# Patient Record
Sex: FEMALE | URBAN - METROPOLITAN AREA
[De-identification: names, ages, dates, MRNs, and addresses within clinical notes are randomized per-mention and may not be internally consistent; named-entity substitution may affect disease eponyms.]

---

## 2024-01-24 ENCOUNTER — OFFICE VISIT (OUTPATIENT)
Dept: OBGYN | Age: 29
End: 2024-01-24
Payer: COMMERCIAL

## 2024-01-24 ENCOUNTER — HOSPITAL ENCOUNTER (OUTPATIENT)
Age: 29
Setting detail: SPECIMEN
Discharge: HOME | End: 2024-01-24
Payer: COMMERCIAL

## 2024-01-24 VITALS
BODY MASS INDEX: 21.44 KG/M2 | WEIGHT: 121 LBS | HEART RATE: 100 BPM | SYSTOLIC BLOOD PRESSURE: 116 MMHG | DIASTOLIC BLOOD PRESSURE: 69 MMHG | HEIGHT: 63 IN

## 2024-01-24 DIAGNOSIS — N91.2 AMENORRHEA: Primary | ICD-10-CM

## 2024-01-24 DIAGNOSIS — Z01.419 WELL WOMAN EXAM WITH ROUTINE GYNECOLOGICAL EXAM: ICD-10-CM

## 2024-01-24 LAB
CONTROL: NORMAL
PREGNANCY TEST URINE, POC: POSITIVE

## 2024-01-24 PROCEDURE — 88142 CYTOPATH C/V THIN LAYER: CPT

## 2024-01-24 PROCEDURE — 87801 DETECT AGNT MULT DNA AMPLI: CPT

## 2024-01-24 PROCEDURE — 81025 URINE PREGNANCY TEST: CPT | Performed by: OBSTETRICS & GYNECOLOGY

## 2024-01-24 PROCEDURE — 87624 HPV HI-RISK TYP POOLED RSLT: CPT

## 2024-01-24 PROCEDURE — 99385 PREV VISIT NEW AGE 18-39: CPT | Performed by: OBSTETRICS & GYNECOLOGY

## 2024-01-24 RX ORDER — CALCIUM CARBONATE 300MG(750)
1 TABLET,CHEWABLE ORAL
Qty: 90 TABLET | Refills: 5 | Status: SHIPPED | OUTPATIENT
Start: 2024-01-24

## 2024-01-24 SDOH — ECONOMIC STABILITY: INCOME INSECURITY: HOW HARD IS IT FOR YOU TO PAY FOR THE VERY BASICS LIKE FOOD, HOUSING, MEDICAL CARE, AND HEATING?: SOMEWHAT HARD

## 2024-01-24 SDOH — ECONOMIC STABILITY: FOOD INSECURITY: WITHIN THE PAST 12 MONTHS, YOU WORRIED THAT YOUR FOOD WOULD RUN OUT BEFORE YOU GOT MONEY TO BUY MORE.: SOMETIMES TRUE

## 2024-01-24 SDOH — ECONOMIC STABILITY: HOUSING INSECURITY
IN THE LAST 12 MONTHS, WAS THERE A TIME WHEN YOU DID NOT HAVE A STEADY PLACE TO SLEEP OR SLEPT IN A SHELTER (INCLUDING NOW)?: NO

## 2024-01-24 SDOH — ECONOMIC STABILITY: FOOD INSECURITY: WITHIN THE PAST 12 MONTHS, THE FOOD YOU BOUGHT JUST DIDN'T LAST AND YOU DIDN'T HAVE MONEY TO GET MORE.: SOMETIMES TRUE

## 2024-01-24 ASSESSMENT — PATIENT HEALTH QUESTIONNAIRE - PHQ9
SUM OF ALL RESPONSES TO PHQ QUESTIONS 1-9: 2
1. LITTLE INTEREST OR PLEASURE IN DOING THINGS: 1
SUM OF ALL RESPONSES TO PHQ9 QUESTIONS 1 & 2: 2
SUM OF ALL RESPONSES TO PHQ QUESTIONS 1-9: 2
2. FEELING DOWN, DEPRESSED OR HOPELESS: 1
SUM OF ALL RESPONSES TO PHQ QUESTIONS 1-9: 2
SUM OF ALL RESPONSES TO PHQ QUESTIONS 1-9: 2

## 2024-01-24 NOTE — PROGRESS NOTES
24    Andrew Hassan  1995    Chief Complaint   Patient presents with    New Patient     Pt here for annual, is sexually active, irregular menses, LMP-2023, pap-never.    Irregular Menses     Pt c/o amenorrhea x 2 months, LMP-2023. Urine pregnancy test (+).         Andrew Hassan is a 28 y.o. female who presents today for evaluation of see above.    Past Medical History:   Diagnosis Date    Dysmenorrhea     Nausea     Pregnant        No past surgical history on file.    Social History     Tobacco Use    Smoking status: Never    Smokeless tobacco: Never   Vaping Use    Vaping Use: Never used   Substance Use Topics    Alcohol use: Not Currently    Drug use: Never       No family history on file.    Current Outpatient Medications   Medication Sig Dispense Refill    Prenatal MV-Min-FA-Omega-3 (PRENATAL GUMMIES/DHA & FA) 0.4-32.5 MG CHEW Take 1 tablet by mouth daily 90 tablet 5     No current facility-administered medications for this visit.       No Known Allergies          There is no immunization history on file for this patient.    Review of Systems  All other systems reviewed and are negative    /69 (Site: Left Upper Arm, Position: Sitting, Cuff Size: Medium Adult)   Pulse 100   Ht 1.6 m (5' 3\")   Wt 54.9 kg (121 lb)   LMP 2023   BMI 21.43 kg/m²     Physical Exam  Nursing note reviewed. Exam conducted with a chaperone present.   HENT:      Head: Normocephalic.      Nose: Nose normal.   Eyes:      Extraocular Movements: Extraocular movements intact.   Pulmonary:      Effort: Pulmonary effort is normal.   Abdominal:      General: Abdomen is flat.      Palpations: Abdomen is soft.      Hernia: There is no hernia in the left inguinal area or right inguinal area.   Genitourinary:     General: Normal vulva.      Exam position: Lithotomy position.      Pubic Area: No rash or pubic lice.       Labia:         Right: No rash, tenderness, lesion or injury.         Left: No rash,

## 2024-01-27 LAB
C TRACH RRNA SPEC QL NAA+PROBE: NEGATIVE
N GONORRHOEA RRNA SPEC QL NAA+PROBE: NEGATIVE

## 2024-01-29 LAB
HPV HIGH RISK: DETECTED
HPV, GENOTYPE 16: NOT DETECTED
HPV, GENOTYPE 18: DETECTED

## 2024-02-14 ENCOUNTER — PROCEDURE VISIT (OUTPATIENT)
Dept: OBGYN | Age: 29
End: 2024-02-14

## 2024-02-14 VITALS
DIASTOLIC BLOOD PRESSURE: 66 MMHG | SYSTOLIC BLOOD PRESSURE: 105 MMHG | WEIGHT: 126 LBS | HEIGHT: 63 IN | BODY MASS INDEX: 22.32 KG/M2

## 2024-02-14 DIAGNOSIS — B97.7 HPV IN FEMALE: Primary | ICD-10-CM

## 2024-02-16 NOTE — PROGRESS NOTES
24    Andrew Hassan  1995    Chief Complaint   Patient presents with    Procedure     Colposcopy today, consent signed. Pap 2024 neg + hpv + 18  Pregnant.         Andrew Hassan is a 28 y.o. female who presents today for evaluation of see above.    Past Medical History:   Diagnosis Date    Dysmenorrhea     Nausea     Pregnant        No past surgical history on file.    Social History     Tobacco Use    Smoking status: Never    Smokeless tobacco: Never   Vaping Use    Vaping Use: Never used   Substance Use Topics    Alcohol use: Not Currently    Drug use: Never       No family history on file.    Current Outpatient Medications   Medication Sig Dispense Refill    Prenatal MV-Min-FA-Omega-3 (PRENATAL GUMMIES/DHA & FA) 0.4-32.5 MG CHEW Take 1 tablet by mouth daily 90 tablet 5     No current facility-administered medications for this visit.       No Known Allergies          There is no immunization history on file for this patient.    Review of Systems  All other systems reviewed and are negative    /66 (Site: Left Upper Arm, Position: Sitting, Cuff Size: Large Adult)   Ht 1.6 m (5' 3\")   Wt 57.2 kg (126 lb)   LMP 2023   BMI 22.32 kg/m²     Physical Exam    Pelvic examination performed.  Acetic acid applied to the cervix.  Colposcope was used to visualize the cervix.  The entire transformation zone was seen.  No abnormal lesions were noted including but not limited to high epithelium, abnormal vessels or mosaicism    No results found for this visit on 24.    ASSESSMENT AND PLAN   Diagnosis Orders   1. HPV in female            Colposcopy today due to negative pap, +hpv 18, consent signed.    Chaperone Ebony Guy was present for the entire appointment.       Return for follow up appointment.    Francis Bowens MD

## 2024-02-22 ENCOUNTER — INITIAL PRENATAL (OUTPATIENT)
Dept: OBGYN | Age: 29
End: 2024-02-22
Payer: COMMERCIAL

## 2024-02-22 VITALS — DIASTOLIC BLOOD PRESSURE: 66 MMHG | SYSTOLIC BLOOD PRESSURE: 108 MMHG | BODY MASS INDEX: 21.79 KG/M2 | WEIGHT: 123 LBS

## 2024-02-22 DIAGNOSIS — O30.012 MONOCHORIONIC AND MONOAMNIOTIC TWIN GESTATION IN SECOND TRIMESTER, ANTEPARTUM: ICD-10-CM

## 2024-02-22 DIAGNOSIS — O99.342 DEPRESSION AFFECTING PREGNANCY IN SECOND TRIMESTER, ANTEPARTUM: ICD-10-CM

## 2024-02-22 DIAGNOSIS — F32.A DEPRESSION AFFECTING PREGNANCY IN SECOND TRIMESTER, ANTEPARTUM: ICD-10-CM

## 2024-02-22 DIAGNOSIS — Z3A.16 16 WEEKS GESTATION OF PREGNANCY: Primary | ICD-10-CM

## 2024-02-22 DIAGNOSIS — Z34.01 ENCOUNTER FOR SUPERVISION OF NORMAL FIRST PREGNANCY IN FIRST TRIMESTER: ICD-10-CM

## 2024-02-22 DIAGNOSIS — O09.30 LATE PRENATAL CARE: ICD-10-CM

## 2024-02-22 DIAGNOSIS — Z34.81 ENCOUNTER FOR SUPERVISION OF OTHER NORMAL PREGNANCY IN FIRST TRIMESTER: ICD-10-CM

## 2024-02-22 PROCEDURE — 0501F PRENATAL FLOW SHEET: CPT | Performed by: NURSE PRACTITIONER

## 2024-02-22 PROCEDURE — 36415 COLL VENOUS BLD VENIPUNCTURE: CPT | Performed by: NURSE PRACTITIONER

## 2024-02-22 PROCEDURE — H1000 PRENATAL CARE ATRISK ASSESSM: HCPCS | Performed by: NURSE PRACTITIONER

## 2024-02-22 PROCEDURE — H1002 CARECOORDINATION PRENATAL: HCPCS | Performed by: NURSE PRACTITIONER

## 2024-02-22 PROCEDURE — 0502F SUBSEQUENT PRENATAL CARE: CPT | Performed by: NURSE PRACTITIONER

## 2024-02-22 RX ORDER — PNV NO.95/FERROUS FUM/FOLIC AC 28MG-0.8MG
TABLET ORAL
COMMUNITY

## 2024-02-22 ASSESSMENT — ENCOUNTER SYMPTOMS
RESPIRATORY NEGATIVE: 1
NAUSEA: 0
CONSTIPATION: 0
GASTROINTESTINAL NEGATIVE: 1
VOMITING: 0
SHORTNESS OF BREATH: 0
DIARRHEA: 0
ABDOMINAL PAIN: 0

## 2024-02-22 NOTE — PROGRESS NOTES
Genotype RFLX    Culture, Urine      3. Monochorionic and monoamniotic twin gestation in second trimester, antepartum  Obstetric panel    HIV Screen    Hepatitis C RNA QNT W Genotype RFLX    Culture, Urine      4. Depression affecting pregnancy in second trimester, antepartum              Prenatal info given, hx and poc reviewed. Cultures up to date and negative. Pap negative with + HPV 18; colp 2/14/24. Labs, Panorama and Carrier Screens collected.      Bldg/pain precautions reviewed  Continue PNV QD  Ok to take Tylenol prn/sparingly    Appt with [facility] 2/20/14; instructed to start ASA QD  Anatomy u/s at [facility]    Depression discussed; describes as mild/intermittent and manageable at this time. Has support.       Return in about 4 weeks (around 3/21/2024).    Joselyn Lopez, SHAILESH - CNP

## 2024-02-23 LAB
ABO + RH BLD: NORMAL
BASOPHILS # BLD: 0 K/UL (ref 0–0.2)
BASOPHILS NFR BLD: 0.2 %
BLD GP AB SCN SERPL QL: NORMAL
DEPRECATED RDW RBC AUTO: 14 % (ref 12.4–15.4)
EOSINOPHIL # BLD: 0 K/UL (ref 0–0.6)
EOSINOPHIL NFR BLD: 0.2 %
HBV SURFACE AG SERPL QL IA: ABNORMAL
HCT VFR BLD AUTO: 33.5 % (ref 36–48)
HCV RNA SERPL NAA+PROBE-ACNC: NOT DETECTED IU/ML
HCV RNA SERPL NAA+PROBE-LOG IU: NOT DETECTED LOG IU/ML
HCV RNA SERPL QL NAA+PROBE: NOT DETECTED
HGB BLD-MCNC: 11.2 G/DL (ref 12–16)
HIV 1+2 AB+HIV1 P24 AG SERPL QL IA: NORMAL
HIV 2 AB SERPL QL IA: NORMAL
HIV1 AB SERPL QL IA: NORMAL
HIV1 P24 AG SERPL QL IA: NORMAL
LYMPHOCYTES # BLD: 1.2 K/UL (ref 1–5.1)
LYMPHOCYTES NFR BLD: 18.5 %
MCH RBC QN AUTO: 28.3 PG (ref 26–34)
MCHC RBC AUTO-ENTMCNC: 33.3 G/DL (ref 31–36)
MCV RBC AUTO: 85 FL (ref 80–100)
MONOCYTES # BLD: 0.5 K/UL (ref 0–1.3)
MONOCYTES NFR BLD: 7.9 %
NEUTROPHILS # BLD: 4.6 K/UL (ref 1.7–7.7)
NEUTROPHILS NFR BLD: 73.2 %
PLATELET # BLD AUTO: 242 K/UL (ref 135–450)
PMV BLD AUTO: 10 FL (ref 5–10.5)
RBC # BLD AUTO: 3.94 M/UL (ref 4–5.2)
REAGIN+T PALLIDUM IGG+IGM SERPL-IMP: ABNORMAL
RUBV IGG SERPL IA-ACNC: 381.6 IU/ML
WBC # BLD AUTO: 6.3 K/UL (ref 4–11)

## 2024-02-24 LAB — BACTERIA UR CULT: NORMAL

## 2024-03-06 LAB
Lab: NORMAL
NTRA 22Q11.2 DELETION SYNDROME POPULATION-BASED RISK TEXT: NORMAL
NTRA 22Q11.2 DELETION SYNDROME RESULT TEXT: NORMAL
NTRA 22Q11.2 DELETION SYNDROME RISK SCORE TEXT: NORMAL
NTRA FETAL FRACTION: NORMAL
NTRA GENDER OF FETUS: NORMAL
NTRA GENDER OF SECOND FETUS: NORMAL
NTRA MONOSOMY X AGE-BASED RISK TEXT: NORMAL
NTRA MONOSOMY X RESULT TEXT: NORMAL
NTRA MONOSOMY X RISK SCORE TEXT: NORMAL
NTRA TRISOMY 13 AGE-BASED RISK TEXT: NORMAL
NTRA TRISOMY 13 RESULT TEXT: NORMAL
NTRA TRISOMY 13 RISK SCORE TEXT: NORMAL
NTRA TRISOMY 18 AGE-BASED RISK TEXT: NORMAL
NTRA TRISOMY 18 RESULT TEXT: NORMAL
NTRA TRISOMY 18 RISK SCORE TEXT: NORMAL
NTRA TRISOMY 21 AGE-BASED RISK TEXT: NORMAL
NTRA TRISOMY 21 RESULT TEXT: NORMAL
NTRA TRISOMY 21 RISK SCORE TEXT: NORMAL
NTRA ZYGOSITY: NORMAL

## 2024-03-12 LAB
Lab: ABNORMAL
Lab: POSITIVE
NTRA ALPHA-THALASSEMIA: POSITIVE
NTRA BETA-HEMOGLOBINOPATHIES: POSITIVE
NTRA CANAVAN DISEASE: NEGATIVE
NTRA CYSTIC FIBROSIS: NEGATIVE
NTRA DUCHENNE/BECKER MUSCULAR DYSTROPHY: NEGATIVE
NTRA FAMILIAL DYSAUTONOMIA: NEGATIVE
NTRA FRAGILE X SYNDROME: NEGATIVE
NTRA GALACTOSEMIA: NEGATIVE
NTRA GAUCHER DISEASE: NEGATIVE
NTRA MEDIUM CHAIN ACYL-COA DEHYDROGENASE DEFICIENCY: NEGATIVE
NTRA POLYCYSTIC KIDNEY DISEASE, AUTOSOMAL RECESSIVE: NEGATIVE
NTRA SMITH-LEMLI-OPITZ SYNDROME: NEGATIVE
NTRA SPINAL MUSCULAR ATROPHY: NEGATIVE
NTRA TAY-SACHS DISEASE: NEGATIVE

## 2024-03-20 ENCOUNTER — ROUTINE PRENATAL (OUTPATIENT)
Dept: OBGYN | Age: 29
End: 2024-03-20

## 2024-03-20 VITALS — BODY MASS INDEX: 23.38 KG/M2 | SYSTOLIC BLOOD PRESSURE: 100 MMHG | DIASTOLIC BLOOD PRESSURE: 59 MMHG | WEIGHT: 132 LBS

## 2024-03-20 DIAGNOSIS — Z3A.20 20 WEEKS GESTATION OF PREGNANCY: ICD-10-CM

## 2024-03-20 DIAGNOSIS — Z34.02 ENCOUNTER FOR SUPERVISION OF NORMAL FIRST PREGNANCY IN SECOND TRIMESTER: Primary | ICD-10-CM

## 2024-03-20 PROCEDURE — 0502F SUBSEQUENT PRENATAL CARE: CPT | Performed by: OBSTETRICS & GYNECOLOGY

## 2024-03-22 NOTE — PROGRESS NOTES
Return OB Office Visit    CC:   Chief Complaint   Patient presents with    Routine Prenatal Visit     Ultrasound at [facility] yesterday for mono/di twins. See genetic labs needs discussed.        HPI:  Patient seen and examined. No concerns/complaints. Denies VB, LOF, ctx. +Fm.  Denies headaches, vision changes, RUQ pain, increased LE edema.  Denies chest pain, shortness of breath, fever, chills, nausea, vomiting.      Review of Systems: The following ROS was otherwise negative, except as noted in the HPI: constitutional, HEENT, respiratory, cardiovascular, gastrointestinal, genitourinary, skin, musculoskeletal, neurological, psych    Objective:  BP (!) 100/59   Wt 59.9 kg (132 lb)   LMP 2023   BMI 23.38 kg/m²     Physical Exam    Gravid, non tender, no flank pain    FHR: + by doppler    Assessment/Plan:   Andrew Hassan is a 28 y.o.  at 20w1d who presents for routine OB visit     Diagnosis Orders   1. Encounter for supervision of normal first pregnancy in second trimester        2. 20 weeks gestation of pregnancy            mono/di twins. Continue seeing [facility]. Reviewed genetic labs.     Chaperone Anna Arias was present for the entire appointment.      The patient will monitor for loss of fluid or vaginal bleeding.  If age-appropriate she will monitor for fetal movement.  If she is having more than 4-6 contractions an hour she will present to labor and delivery.  She will continue taking her prenatal vitamins as prescribed.  All up-to-date prenatal labs and imaging were reviewed and discussed with patient.    No follow-ups on file.    Francis Bowens MD

## 2024-04-17 ENCOUNTER — ROUTINE PRENATAL (OUTPATIENT)
Dept: OBGYN | Age: 29
End: 2024-04-17

## 2024-04-17 VITALS — DIASTOLIC BLOOD PRESSURE: 74 MMHG | SYSTOLIC BLOOD PRESSURE: 104 MMHG | WEIGHT: 140 LBS | BODY MASS INDEX: 24.8 KG/M2

## 2024-04-17 DIAGNOSIS — Z3A.24 24 WEEKS GESTATION OF PREGNANCY: Primary | ICD-10-CM

## 2024-04-17 DIAGNOSIS — O09.92 HIGH RISK FOR INTRAPARTUM COMPLICATIONS IN SECOND TRIMESTER: ICD-10-CM

## 2024-04-17 DIAGNOSIS — O30.012 MONOCHORIONIC AND MONOAMNIOTIC TWIN GESTATION IN SECOND TRIMESTER, ANTEPARTUM: ICD-10-CM

## 2024-04-17 PROCEDURE — 0502F SUBSEQUENT PRENATAL CARE: CPT | Performed by: OBSTETRICS & GYNECOLOGY

## 2024-04-17 RX ORDER — ONDANSETRON 4 MG/1
4 TABLET, ORALLY DISINTEGRATING ORAL 3 TIMES DAILY PRN
Qty: 21 TABLET | Refills: 0 | Status: SHIPPED | OUTPATIENT
Start: 2024-04-17

## 2024-04-17 NOTE — PROGRESS NOTES
Return OB Office Visit    CC:   Chief Complaint   Patient presents with    Routine Prenatal Visit     Pt c/o left sided rib pain x couple days. Pt states she is craving mud, dirt, jose x couple wks,  heart burn x few days. 1 hr gtt today.        HPI:  Patient seen and examined. No concerns/complaints. Denies VB, LOF, ctx. +Fm.  Denies headaches, vision changes, RUQ pain, increased LE edema.  Denies chest pain, shortness of breath, fever, chills, nausea, vomiting.      Review of Systems: The following ROS was otherwise negative, except as noted in the HPI: constitutional, HEENT, respiratory, cardiovascular, gastrointestinal, genitourinary, skin, musculoskeletal, neurological, psych    Objective:  /74   Wt 63.5 kg (140 lb)   LMP 2023   BMI 24.80 kg/m²     Physical Exam    Gravid, non tender, no flank pain    FHR: + by doppler    Assessment/Plan:   Andrew Hassan is a 28 y.o.  at 24w0d who presents for routine OB visit     Diagnosis Orders   1. 24 weeks gestation of pregnancy        2. Monochorionic and monoamniotic twin gestation in second trimester, antepartum        3. High risk for intrapartum complications in second trimester  ondansetron (ZOFRAN-ODT) 4 MG disintegrating tablet          1 hr gtt not tolerated by patient today  +heart tones  Repeat 1 hr gtt next appt       Chaperone Ebony Guy was present for the entire appointment.      All up-to-date obstetric labwork and imaging reviewed for today's encounter.     Patient was instructed to watch for vaginal bleeding or loss of fluid.  She will call with increasing contractions.  Fetal kick counts were discussed.  She will maintain her prenatal vitamin every day.    Return in about 4 weeks (around 5/15/2024).    Francis Bowens MD

## 2024-04-20 LAB
REASON FOR REJECTION: NORMAL
REJECTED TEST: NORMAL

## 2024-05-01 ENCOUNTER — ROUTINE PRENATAL (OUTPATIENT)
Dept: OBGYN | Age: 29
End: 2024-05-01
Payer: COMMERCIAL

## 2024-05-01 VITALS
BODY MASS INDEX: 25.16 KG/M2 | WEIGHT: 142 LBS | HEIGHT: 63 IN | DIASTOLIC BLOOD PRESSURE: 75 MMHG | SYSTOLIC BLOOD PRESSURE: 123 MMHG

## 2024-05-01 DIAGNOSIS — O09.92 HIGH RISK FOR INTRAPARTUM COMPLICATIONS IN SECOND TRIMESTER: ICD-10-CM

## 2024-05-01 DIAGNOSIS — O30.012 MONOCHORIONIC AND MONOAMNIOTIC TWIN GESTATION IN SECOND TRIMESTER, ANTEPARTUM: ICD-10-CM

## 2024-05-01 DIAGNOSIS — Z3A.26 26 WEEKS GESTATION OF PREGNANCY: Primary | ICD-10-CM

## 2024-05-01 LAB
DEPRECATED RDW RBC AUTO: 13.8 % (ref 12.4–15.4)
GLUCOSE 1H P 50 G GLC PO SERPL-MCNC: 87 MG/DL
HCT VFR BLD AUTO: 30.2 % (ref 36–48)
HGB BLD-MCNC: 10.3 G/DL (ref 12–16)
MCH RBC QN AUTO: 29.7 PG (ref 26–34)
MCHC RBC AUTO-ENTMCNC: 34.2 G/DL (ref 31–36)
MCV RBC AUTO: 86.7 FL (ref 80–100)
PLATELET # BLD AUTO: 226 K/UL (ref 135–450)
PMV BLD AUTO: 11.3 FL (ref 5–10.5)
RBC # BLD AUTO: 3.48 M/UL (ref 4–5.2)
WBC # BLD AUTO: 5.4 K/UL (ref 4–11)

## 2024-05-01 PROCEDURE — 36415 COLL VENOUS BLD VENIPUNCTURE: CPT | Performed by: OBSTETRICS & GYNECOLOGY

## 2024-05-01 PROCEDURE — 0502F SUBSEQUENT PRENATAL CARE: CPT | Performed by: OBSTETRICS & GYNECOLOGY

## 2024-05-01 NOTE — PROGRESS NOTES
Return OB Office Visit    CC:   Chief Complaint   Patient presents with    Routine Prenatal Visit     +fm. 1 hr gtt today. Pt states unable to tolerate iron pills. Pt c/o lightheaded and dizziness. Pt was seen at  yesterday.        HPI:  Patient seen and examined. No concerns/complaints. Denies VB, LOF, ctx. +Fm.  Denies headaches, vision changes, RUQ pain, increased LE edema.  Denies chest pain, shortness of breath, fever, chills, nausea, vomiting.      Review of Systems: The following ROS was otherwise negative, except as noted in the HPI: constitutional, HEENT, respiratory, cardiovascular, gastrointestinal, genitourinary, skin, musculoskeletal, neurological, psych    Objective:  /75   Ht 1.6 m (5' 3\")   Wt 64.4 kg (142 lb)   LMP 2023   BMI 25.15 kg/m²     Physical Exam    Gravid, non tender, no flank pain    FHR: + by doppler    Assessment/Plan:   Andrew Hassan is a 28 y.o.  at 26w0d who presents for routine OB visit     Diagnosis Orders   1. 26 weeks gestation of pregnancy  CBC    Glucose Challenge Gestational    Syphilis Antibody Cascading Reflex      2. Monochorionic and monoamniotic twin gestation in second trimester, antepartum        3. High risk for intrapartum complications in second trimester  CBC    Glucose Challenge Gestational    Syphilis Antibody Cascading Reflex          Continue visits at [facility]   section at 37 weeks  +heart tones     Chaperone Ebony Guy was present for the entire appointment.      The patient will monitor for loss of fluid or vaginal bleeding.  If age-appropriate she will monitor for fetal movement.  If she is having more than 4-6 contractions an hour she will present to labor and delivery.  She will continue taking her prenatal vitamins as prescribed.  All up-to-date prenatal labs and imaging were reviewed and discussed with patient.    Return in about 2 weeks (around 5/15/2024).    Francis Bowens MD

## 2024-05-02 LAB — REAGIN+T PALLIDUM IGG+IGM SERPL-IMP: NORMAL

## 2024-05-15 ENCOUNTER — ROUTINE PRENATAL (OUTPATIENT)
Dept: OBGYN | Age: 29
End: 2024-05-15

## 2024-05-15 VITALS — SYSTOLIC BLOOD PRESSURE: 109 MMHG | DIASTOLIC BLOOD PRESSURE: 74 MMHG

## 2024-05-15 DIAGNOSIS — Z3A.28 28 WEEKS GESTATION OF PREGNANCY: Primary | ICD-10-CM

## 2024-05-15 DIAGNOSIS — O30.012 MONOCHORIONIC AND MONOAMNIOTIC TWIN GESTATION IN SECOND TRIMESTER, ANTEPARTUM: ICD-10-CM

## 2024-05-15 DIAGNOSIS — O09.92 HIGH RISK FOR INTRAPARTUM COMPLICATIONS IN SECOND TRIMESTER: ICD-10-CM

## 2024-05-15 PROCEDURE — 0502F SUBSEQUENT PRENATAL CARE: CPT | Performed by: OBSTETRICS & GYNECOLOGY

## 2024-05-15 NOTE — PROGRESS NOTES
Return OB Office Visit    CC:   Chief Complaint   Patient presents with    Routine Prenatal Visit     Beckham/Di Twins; seeing MFM.  Pt states feeling more movement with baby A than baby B. T-dap offered; pt to think about it.        HPI:  Patient seen and examined. No concerns/complaints. Denies VB, LOF, ctx. +Fm.  Denies headaches, vision changes, RUQ pain, increased LE edema.  Denies chest pain, shortness of breath, fever, chills, nausea, vomiting.      Review of Systems: The following ROS was otherwise negative, except as noted in the HPI: constitutional, HEENT, respiratory, cardiovascular, gastrointestinal, genitourinary, skin, musculoskeletal, neurological, psych    Objective:  /74   LMP 2023     Physical Exam    Gravid, non tender, no flank pain    FHR: + by doppler    Assessment/Plan:   Andrew Hassan is a 28 y.o.  at 28w0d who presents for routine OB visit     Diagnosis Orders   1. 28 weeks gestation of pregnancy        2. High risk for intrapartum complications in second trimester        3. Monochorionic and monoamniotic twin gestation in second trimester, antepartum        MFM apt today to assess growth, etc    Data Unavailable     Chaperone Anna Arias was present for the entire appointment.      All up-to-date obstetric labwork and imaging reviewed for today's encounter.     Patient was instructed to watch for vaginal bleeding or loss of fluid.  She will call with increasing contractions.  Fetal kick counts were discussed.  She will maintain her prenatal vitamin every day.    No follow-ups on file.    Francis Bowens MD

## 2024-05-29 ENCOUNTER — ROUTINE PRENATAL (OUTPATIENT)
Dept: OBGYN | Age: 29
End: 2024-05-29

## 2024-05-29 VITALS — BODY MASS INDEX: 26.57 KG/M2 | SYSTOLIC BLOOD PRESSURE: 113 MMHG | DIASTOLIC BLOOD PRESSURE: 73 MMHG | WEIGHT: 150 LBS

## 2024-05-29 DIAGNOSIS — O30.012 MONOCHORIONIC AND MONOAMNIOTIC TWIN GESTATION IN SECOND TRIMESTER, ANTEPARTUM: ICD-10-CM

## 2024-05-29 DIAGNOSIS — O09.93 HIGH-RISK PREGNANCY, THIRD TRIMESTER: Primary | ICD-10-CM

## 2024-05-29 DIAGNOSIS — Z3A.30 30 WEEKS GESTATION OF PREGNANCY: ICD-10-CM

## 2024-05-29 PROCEDURE — 0502F SUBSEQUENT PRENATAL CARE: CPT | Performed by: OBSTETRICS & GYNECOLOGY

## 2024-05-29 NOTE — PROGRESS NOTES
Return OB Office Visit    CC:   Chief Complaint   Patient presents with    Routine Prenatal Visit     Taking pnv, +fm   Complains of acid reflux   Has had appts with MFM  Eating, drinking, voiding, BM without difficulty        HPI:  Patient seen and examined. No concerns/complaints. Denies VB, LOF, ctx. +Fm.  Denies headaches, vision changes, RUQ pain, increased LE edema.  Denies chest pain, shortness of breath, fever, chills, nausea, vomiting.      Review of Systems: The following ROS was otherwise negative, except as noted in the HPI: constitutional, HEENT, respiratory, cardiovascular, gastrointestinal, genitourinary, skin, musculoskeletal, neurological, psych    Objective:  /73   Wt 68 kg (150 lb)   LMP 2023   BMI 26.57 kg/m²     Physical Exam    Gravid, non tender, no flank pain    FHR: + by doppler    Assessment/Plan:   Andrew Hassan is a 28 y.o.  at 30w0d who presents for routine OB visit     Diagnosis Orders   1. High-risk pregnancy, third trimester        2. Monochorionic and monoamniotic twin gestation in second trimester, antepartum        3. 30 weeks gestation of pregnancy            Data Unavailable     Chaperone Alee Tucker was present for the entire appointment.      All up-to-date obstetric labwork and imaging reviewed for today's encounter.     Patient was instructed to watch for vaginal bleeding or loss of fluid.  She will call with increasing contractions.  Fetal kick counts were discussed.  She will maintain her prenatal vitamin every day.    No follow-ups on file.    Francis Bowens MD

## 2024-06-12 ENCOUNTER — ROUTINE PRENATAL (OUTPATIENT)
Dept: OBGYN | Age: 29
End: 2024-06-12

## 2024-06-12 VITALS — DIASTOLIC BLOOD PRESSURE: 72 MMHG | SYSTOLIC BLOOD PRESSURE: 110 MMHG | WEIGHT: 150 LBS | BODY MASS INDEX: 26.57 KG/M2

## 2024-06-12 DIAGNOSIS — O09.93 HIGH-RISK PREGNANCY, THIRD TRIMESTER: Primary | ICD-10-CM

## 2024-06-12 DIAGNOSIS — Z3A.32 32 WEEKS GESTATION OF PREGNANCY: ICD-10-CM

## 2024-06-12 DIAGNOSIS — O30.012 MONOCHORIONIC AND MONOAMNIOTIC TWIN GESTATION IN SECOND TRIMESTER, ANTEPARTUM: ICD-10-CM

## 2024-06-12 PROCEDURE — 0502F SUBSEQUENT PRENATAL CARE: CPT | Performed by: OBSTETRICS & GYNECOLOGY

## 2024-06-12 NOTE — PROGRESS NOTES
Return OB Office Visit    CC:   Chief Complaint   Patient presents with    Routine Prenatal Visit     Taking pnv, +fm  Complains of insomnia and RLE edema   Eating, drinking, voiding, BM without difficulty        HPI:  Patient seen and examined. No concerns/complaints. Denies VB, LOF, ctx. +Fm.  Denies headaches, vision changes, RUQ pain, increased LE edema.  Denies chest pain, shortness of breath, fever, chills, nausea, vomiting.      Review of Systems: The following ROS was otherwise negative, except as noted in the HPI: constitutional, HEENT, respiratory, cardiovascular, gastrointestinal, genitourinary, skin, musculoskeletal, neurological, psych    Objective:  /72   Wt 68 kg (150 lb)   LMP 2023   BMI 26.57 kg/m²     Physical Exam    Gravid, non tender, no flank pain    FHR: + by doppler    Assessment/Plan:   Andrew Hassan is a 28 y.o.  at 32w0d who presents for routine OB visit     Diagnosis Orders   1. High-risk pregnancy, third trimester        2. Monochorionic and monoamniotic twin gestation in second trimester, antepartum        3. 32 weeks gestation of pregnancy          CS at 37 weeks  Data Unavailable     Chaperone Anna Arias was present for the entire appointment.      All up-to-date obstetric labwork and imaging reviewed for today's encounter.     Patient was instructed to watch for vaginal bleeding or loss of fluid.  She will call with increasing contractions.  Fetal kick counts were discussed.  She will maintain her prenatal vitamin every day.    Return in about 2 weeks (around 2024) for follow up appointment.    Francis Bowens MD

## 2024-06-26 ENCOUNTER — ROUTINE PRENATAL (OUTPATIENT)
Dept: OBGYN | Age: 29
End: 2024-06-26

## 2024-06-26 VITALS — DIASTOLIC BLOOD PRESSURE: 71 MMHG | BODY MASS INDEX: 28.34 KG/M2 | SYSTOLIC BLOOD PRESSURE: 114 MMHG | WEIGHT: 160 LBS

## 2024-06-26 DIAGNOSIS — O09.93 HIGH-RISK PREGNANCY, THIRD TRIMESTER: Primary | ICD-10-CM

## 2024-06-26 DIAGNOSIS — Z3A.34 34 WEEKS GESTATION OF PREGNANCY: ICD-10-CM

## 2024-06-26 DIAGNOSIS — O30.033 MONOCHORIONIC DIAMNIOTIC TWIN GESTATION IN THIRD TRIMESTER: ICD-10-CM

## 2024-06-26 PROCEDURE — 0502F SUBSEQUENT PRENATAL CARE: CPT | Performed by: OBSTETRICS & GYNECOLOGY

## 2024-06-26 NOTE — PROGRESS NOTES
Return OB Office Visit    CC:   Chief Complaint   Patient presents with    Routine Prenatal Visit     +fm. Stopped taking pnv, states made her sick  MD-twins  C/o rib pain, insomnia and LE edema for 3-4 weeks       HPI:  Patient seen and examined. No concerns/complaints. Denies VB, LOF, ctx. +Fm.  Denies headaches, vision changes, RUQ pain, increased LE edema.  Denies chest pain, shortness of breath, fever, chills, nausea, vomiting.      Review of Systems: The following ROS was otherwise negative, except as noted in the HPI: constitutional, HEENT, respiratory, cardiovascular, gastrointestinal, genitourinary, skin, musculoskeletal, neurological, psych    Objective:  /71   Wt 72.6 kg (160 lb)   LMP 2023   BMI 28.34 kg/m²     Physical Exam    Gravid, non tender, no flank pain    FHR: + by doppler    Assessment/Plan:   Andrew Hassan is a 28 y.o.  at 34w0d who presents for routine OB visit     Diagnosis Orders   1. High-risk pregnancy, third trimester        2. 34 weeks gestation of pregnancy        3. Monochorionic diamniotic twin gestation in third trimester        Recent apt with mfm  RBAC discussed, vtx vtx, will still procede with CS    Data Unavailable     Chaperone Ebony Guy was present for the entire appointment.      All up-to-date obstetric labwork and imaging reviewed for today's encounter.     Patient was instructed to watch for vaginal bleeding or loss of fluid.  She will call with increasing contractions.  Fetal kick counts were discussed.  She will maintain her prenatal vitamin every day.    No follow-ups on file.    Francis Bowens MD

## 2024-07-01 ENCOUNTER — APPOINTMENT (OUTPATIENT)
Dept: GENERAL RADIOLOGY | Age: 29
End: 2024-07-01
Payer: COMMERCIAL

## 2024-07-01 ENCOUNTER — HOSPITAL ENCOUNTER (INPATIENT)
Age: 29
LOS: 3 days | Discharge: HOME | End: 2024-07-04
Attending: OBSTETRICS & GYNECOLOGY | Admitting: OBSTETRICS & GYNECOLOGY
Payer: COMMERCIAL

## 2024-07-01 ENCOUNTER — ANESTHESIA (OUTPATIENT)
Dept: LABOR AND DELIVERY | Age: 29
End: 2024-07-01
Payer: COMMERCIAL

## 2024-07-01 ENCOUNTER — ANESTHESIA EVENT (OUTPATIENT)
Dept: LABOR AND DELIVERY | Age: 29
End: 2024-07-01
Payer: COMMERCIAL

## 2024-07-01 PROBLEM — Z3A.34 34 WEEKS GESTATION OF PREGNANCY: Status: ACTIVE | Noted: 2024-07-01

## 2024-07-01 PROBLEM — O14.13 SEVERE PRE-ECLAMPSIA IN THIRD TRIMESTER: Status: ACTIVE | Noted: 2024-07-01

## 2024-07-01 PROBLEM — O30.033 MONOCHORIONIC DIAMNIOTIC TWIN GESTATION IN THIRD TRIMESTER: Status: ACTIVE | Noted: 2024-07-01

## 2024-07-01 LAB
ALBUMIN SERPL-MCNC: 3 GM/DL (ref 3.4–5)
ALP BLD-CCNC: 242 IU/L (ref 40–129)
ALT SERPL-CCNC: 22 U/L (ref 10–40)
AMPHETAMINES: NEGATIVE
ANION GAP SERPL CALCULATED.3IONS-SCNC: 12 MMOL/L (ref 7–16)
AST SERPL-CCNC: 25 IU/L (ref 15–37)
BACTERIA: NEGATIVE /HPF
BARBITURATE SCREEN URINE: NEGATIVE
BASE EXCESS MIXED: 6.9 (ref 0–3)
BASE EXCESS MIXED: 7.2 (ref 0–3)
BASE EXCESS MIXED: 7.8 (ref 0–3)
BASE EXCESS: ABNORMAL (ref 0–3)
BENZODIAZEPINE SCREEN, URINE: NEGATIVE
BILIRUB SERPL-MCNC: 0.5 MG/DL (ref 0–1)
BILIRUBIN, URINE: NEGATIVE MG/DL
BLOOD, URINE: ABNORMAL
BUN SERPL-MCNC: 3 MG/DL (ref 6–23)
CALCIUM SERPL-MCNC: 8.5 MG/DL (ref 8.3–10.6)
CANNABINOID SCREEN URINE: NEGATIVE
CHLORIDE BLD-SCNC: 107 MMOL/L (ref 99–110)
CLARITY, UA: CLEAR
CO2 CONTENT: 18.5 MMOL/L (ref 21–32)
CO2 CONTENT: 19.3 MMOL/L (ref 21–32)
CO2 CONTENT: 21.8 MMOL/L (ref 21–32)
CO2: 19 MMOL/L (ref 21–32)
COCAINE METABOLITE: NEGATIVE
COLOR, UA: YELLOW
COMMENT: ABNORMAL
CREAT SERPL-MCNC: 0.6 MG/DL (ref 0.6–1.1)
CREATININE URINE: 42.3 MG/DL (ref 28–217)
FENTANYL URINE: NEGATIVE
GFR, ESTIMATED: >90 ML/MIN/1.73M2
GLUCOSE SERPL-MCNC: 90 MG/DL (ref 70–99)
GLUCOSE URINE: NEGATIVE MG/DL
HCO3 ARTERIAL: 17.5 MMOL/L (ref 21–28)
HCO3 ARTERIAL: 18.2 MMOL/L (ref 21–28)
HCO3 ARTERIAL: 20.3 MMOL/L (ref 21–28)
HCT VFR BLD CALC: 27.9 % (ref 37–47)
HEMOGLOBIN: 9 GM/DL (ref 12.5–16)
KETONES, URINE: NEGATIVE MG/DL
LEUKOCYTE ESTERASE, URINE: NEGATIVE
MCH RBC QN AUTO: 27.4 PG (ref 27–31)
MCHC RBC AUTO-ENTMCNC: 32.3 % (ref 32–36)
MCV RBC AUTO: 84.8 FL (ref 78–100)
MUCUS: ABNORMAL HPF
NITRITE URINE, QUANTITATIVE: NEGATIVE
O2 SATURATION: 12.6 % (ref 94–98)
O2 SATURATION: 38.4 % (ref 94–98)
O2 SATURATION: 75.9 % (ref 94–98)
OPIATES, URINE: NEGATIVE
OXYCODONE: NEGATIVE
PCO2 ARTERIAL: 34.7 MMHG (ref 35–48)
PCO2 ARTERIAL: 34.7 MMHG (ref 35–48)
PCO2 ARTERIAL: 47.1 MMHG (ref 35–48)
PDW BLD-RTO: 13.5 % (ref 11.7–14.9)
PH BLOOD: 7.24 (ref 7.35–7.45)
PH BLOOD: 7.31 (ref 7.35–7.45)
PH BLOOD: 7.33 (ref 7.35–7.45)
PH, URINE: 6 (ref 5–8)
PLATELET # BLD: 216 K/CU MM (ref 140–440)
PMV BLD AUTO: 12.5 FL (ref 7.5–11.1)
PO2 ARTERIAL: 12.5 MMHG (ref 83–108)
PO2 ARTERIAL: 26.3 MMHG (ref 83–108)
PO2 ARTERIAL: 44 MMHG (ref 83–108)
POTASSIUM SERPL-SCNC: 3.3 MMOL/L (ref 3.5–5.1)
PROT/CREAT RATIO, UR: 0.2
PROTEIN UA: NEGATIVE MG/DL
RBC # BLD: 3.29 M/CU MM (ref 4.2–5.4)
RBC URINE: <1 /HPF (ref 0–6)
SODIUM BLD-SCNC: 138 MMOL/L (ref 135–145)
SOURCE, BLOOD GAS: ABNORMAL
SPECIFIC GRAVITY UA: 1.01 (ref 1–1.03)
SQUAMOUS EPITHELIAL: 3 /HPF
T. PALLIDUM SCREEN: NEGATIVE
TOTAL PROTEIN: 5.7 GM/DL (ref 6.4–8.2)
TRICHOMONAS: ABNORMAL /HPF
URINE TOTAL PROTEIN: 10.5 MG/DL
UROBILINOGEN, URINE: 1 MG/DL (ref 0.2–1)
WBC # BLD: 6.9 K/CU MM (ref 4–10.5)
WBC UA: 2 /HPF (ref 0–5)

## 2024-07-01 PROCEDURE — 7100000000 HC PACU RECOVERY - FIRST 15 MIN: Performed by: STUDENT IN AN ORGANIZED HEALTH CARE EDUCATION/TRAINING PROGRAM

## 2024-07-01 PROCEDURE — 59510 CESAREAN DELIVERY: CPT | Performed by: STUDENT IN AN ORGANIZED HEALTH CARE EDUCATION/TRAINING PROGRAM

## 2024-07-01 PROCEDURE — 2580000003 HC RX 258: Performed by: OBSTETRICS & GYNECOLOGY

## 2024-07-01 PROCEDURE — 86922 COMPATIBILITY TEST ANTIGLOB: CPT

## 2024-07-01 PROCEDURE — 6360000002 HC RX W HCPCS: Performed by: STUDENT IN AN ORGANIZED HEALTH CARE EDUCATION/TRAINING PROGRAM

## 2024-07-01 PROCEDURE — 74018 RADEX ABDOMEN 1 VIEW: CPT

## 2024-07-01 PROCEDURE — 6360000002 HC RX W HCPCS: Performed by: OBSTETRICS & GYNECOLOGY

## 2024-07-01 PROCEDURE — 80307 DRUG TEST PRSMV CHEM ANLYZR: CPT

## 2024-07-01 PROCEDURE — 2709999900 HC NON-CHARGEABLE SUPPLY: Performed by: STUDENT IN AN ORGANIZED HEALTH CARE EDUCATION/TRAINING PROGRAM

## 2024-07-01 PROCEDURE — 1220000000 HC SEMI PRIVATE OB R&B

## 2024-07-01 PROCEDURE — 7100000001 HC PACU RECOVERY - ADDTL 15 MIN: Performed by: STUDENT IN AN ORGANIZED HEALTH CARE EDUCATION/TRAINING PROGRAM

## 2024-07-01 PROCEDURE — 82803 BLOOD GASES ANY COMBINATION: CPT

## 2024-07-01 PROCEDURE — 86780 TREPONEMA PALLIDUM: CPT

## 2024-07-01 PROCEDURE — 59514 CESAREAN DELIVERY ONLY: CPT | Performed by: ADVANCED PRACTICE MIDWIFE

## 2024-07-01 PROCEDURE — 6360000002 HC RX W HCPCS: Performed by: NURSE ANESTHETIST, CERTIFIED REGISTERED

## 2024-07-01 PROCEDURE — 86900 BLOOD TYPING SEROLOGIC ABO: CPT

## 2024-07-01 PROCEDURE — 2580000003 HC RX 258: Performed by: NURSE ANESTHETIST, CERTIFIED REGISTERED

## 2024-07-01 PROCEDURE — 81001 URINALYSIS AUTO W/SCOPE: CPT

## 2024-07-01 PROCEDURE — 2580000003 HC RX 258: Performed by: STUDENT IN AN ORGANIZED HEALTH CARE EDUCATION/TRAINING PROGRAM

## 2024-07-01 PROCEDURE — 10907ZC DRAINAGE OF AMNIOTIC FLUID, THERAPEUTIC FROM PRODUCTS OF CONCEPTION, VIA NATURAL OR ARTIFICIAL OPENING: ICD-10-PCS | Performed by: STUDENT IN AN ORGANIZED HEALTH CARE EDUCATION/TRAINING PROGRAM

## 2024-07-01 PROCEDURE — 2580000003 HC RX 258: Performed by: ADVANCED PRACTICE MIDWIFE

## 2024-07-01 PROCEDURE — 80053 COMPREHEN METABOLIC PANEL: CPT

## 2024-07-01 PROCEDURE — 2720000010 HC SURG SUPPLY STERILE: Performed by: STUDENT IN AN ORGANIZED HEALTH CARE EDUCATION/TRAINING PROGRAM

## 2024-07-01 PROCEDURE — 6360000002 HC RX W HCPCS: Performed by: ADVANCED PRACTICE MIDWIFE

## 2024-07-01 PROCEDURE — 86850 RBC ANTIBODY SCREEN: CPT

## 2024-07-01 PROCEDURE — 86901 BLOOD TYPING SEROLOGIC RH(D): CPT

## 2024-07-01 PROCEDURE — APPNB30 APP NON BILLABLE TIME 0-30 MINS: Performed by: ADVANCED PRACTICE MIDWIFE

## 2024-07-01 PROCEDURE — 82570 ASSAY OF URINE CREATININE: CPT

## 2024-07-01 PROCEDURE — 51702 INSERT TEMP BLADDER CATH: CPT

## 2024-07-01 PROCEDURE — 3700000001 HC ADD 15 MINUTES (ANESTHESIA): Performed by: STUDENT IN AN ORGANIZED HEALTH CARE EDUCATION/TRAINING PROGRAM

## 2024-07-01 PROCEDURE — 6370000000 HC RX 637 (ALT 250 FOR IP): Performed by: STUDENT IN AN ORGANIZED HEALTH CARE EDUCATION/TRAINING PROGRAM

## 2024-07-01 PROCEDURE — 87081 CULTURE SCREEN ONLY: CPT

## 2024-07-01 PROCEDURE — 6370000000 HC RX 637 (ALT 250 FOR IP): Performed by: ADVANCED PRACTICE MIDWIFE

## 2024-07-01 PROCEDURE — 84156 ASSAY OF PROTEIN URINE: CPT

## 2024-07-01 PROCEDURE — 3700000000 HC ANESTHESIA ATTENDED CARE: Performed by: STUDENT IN AN ORGANIZED HEALTH CARE EDUCATION/TRAINING PROGRAM

## 2024-07-01 PROCEDURE — 3609079900 HC CESAREAN SECTION: Performed by: STUDENT IN AN ORGANIZED HEALTH CARE EDUCATION/TRAINING PROGRAM

## 2024-07-01 PROCEDURE — 3700000025 EPIDURAL BLOCK: Performed by: ANESTHESIOLOGY

## 2024-07-01 PROCEDURE — 85027 COMPLETE CBC AUTOMATED: CPT

## 2024-07-01 PROCEDURE — 88307 TISSUE EXAM BY PATHOLOGIST: CPT | Performed by: PATHOLOGY

## 2024-07-01 PROCEDURE — 99212 OFFICE O/P EST SF 10 MIN: CPT

## 2024-07-01 PROCEDURE — 2500000003 HC RX 250 WO HCPCS: Performed by: NURSE ANESTHETIST, CERTIFIED REGISTERED

## 2024-07-01 RX ORDER — MORPHINE SULFATE 0.5 MG/ML
INJECTION, SOLUTION EPIDURAL; INTRATHECAL; INTRAVENOUS PRN
Status: DISCONTINUED | OUTPATIENT
Start: 2024-07-01 | End: 2024-07-01 | Stop reason: SDUPTHER

## 2024-07-01 RX ORDER — ACETAMINOPHEN 500 MG
1000 TABLET ORAL EVERY 8 HOURS SCHEDULED
Status: DISCONTINUED | OUTPATIENT
Start: 2024-07-01 | End: 2024-07-04 | Stop reason: HOSPADM

## 2024-07-01 RX ORDER — FAMOTIDINE 10 MG/ML
20 INJECTION, SOLUTION INTRAVENOUS 2 TIMES DAILY PRN
Status: DISCONTINUED | OUTPATIENT
Start: 2024-07-01 | End: 2024-07-01

## 2024-07-01 RX ORDER — TERBUTALINE SULFATE 1 MG/ML
0.25 INJECTION, SOLUTION SUBCUTANEOUS
Status: DISCONTINUED | OUTPATIENT
Start: 2024-07-01 | End: 2024-07-01

## 2024-07-01 RX ORDER — SODIUM CHLORIDE, SODIUM LACTATE, POTASSIUM CHLORIDE, AND CALCIUM CHLORIDE .6; .31; .03; .02 G/100ML; G/100ML; G/100ML; G/100ML
500 INJECTION, SOLUTION INTRAVENOUS PRN
Status: DISCONTINUED | OUTPATIENT
Start: 2024-07-01 | End: 2024-07-01

## 2024-07-01 RX ORDER — KETOROLAC TROMETHAMINE 30 MG/ML
30 INJECTION, SOLUTION INTRAMUSCULAR; INTRAVENOUS EVERY 6 HOURS
Status: DISPENSED | OUTPATIENT
Start: 2024-07-01 | End: 2024-07-02

## 2024-07-01 RX ORDER — ONDANSETRON 2 MG/ML
4 INJECTION INTRAMUSCULAR; INTRAVENOUS EVERY 6 HOURS PRN
Status: DISCONTINUED | OUTPATIENT
Start: 2024-07-01 | End: 2024-07-01

## 2024-07-01 RX ORDER — OXYCODONE HYDROCHLORIDE 5 MG/1
10 TABLET ORAL EVERY 4 HOURS PRN
Status: DISCONTINUED | OUTPATIENT
Start: 2024-07-02 | End: 2024-07-04 | Stop reason: HOSPADM

## 2024-07-01 RX ORDER — POTASSIUM CHLORIDE 20 MEQ/1
40 TABLET, EXTENDED RELEASE ORAL ONCE
Status: COMPLETED | OUTPATIENT
Start: 2024-07-01 | End: 2024-07-01

## 2024-07-01 RX ORDER — LIDOCAINE HCL/EPINEPHRINE/PF 2%-1:200K
VIAL (ML) INJECTION PRN
Status: DISCONTINUED | OUTPATIENT
Start: 2024-07-01 | End: 2024-07-01 | Stop reason: SDUPTHER

## 2024-07-01 RX ORDER — ONDANSETRON 4 MG/1
4 TABLET, ORALLY DISINTEGRATING ORAL EVERY 8 HOURS PRN
Status: DISCONTINUED | OUTPATIENT
Start: 2024-07-01 | End: 2024-07-04 | Stop reason: HOSPADM

## 2024-07-01 RX ORDER — HYDRALAZINE HYDROCHLORIDE 20 MG/ML
10 INJECTION INTRAMUSCULAR; INTRAVENOUS
Status: DISCONTINUED | OUTPATIENT
Start: 2024-07-01 | End: 2024-07-01

## 2024-07-01 RX ORDER — HYDRALAZINE HYDROCHLORIDE 20 MG/ML
5 INJECTION INTRAMUSCULAR; INTRAVENOUS
Status: COMPLETED | OUTPATIENT
Start: 2024-07-01 | End: 2024-07-01

## 2024-07-01 RX ORDER — MAGNESIUM SULFATE 4 G/50ML
4000 INJECTION INTRAVENOUS ONCE
Status: COMPLETED | OUTPATIENT
Start: 2024-07-01 | End: 2024-07-01

## 2024-07-01 RX ORDER — LIDOCAINE HYDROCHLORIDE 10 MG/ML
30 INJECTION, SOLUTION EPIDURAL; INFILTRATION; INTRACAUDAL; PERINEURAL PRN
Status: DISCONTINUED | OUTPATIENT
Start: 2024-07-01 | End: 2024-07-01

## 2024-07-01 RX ORDER — CALCIUM GLUCONATE 94 MG/ML
1000 INJECTION, SOLUTION INTRAVENOUS PRN
Status: DISCONTINUED | OUTPATIENT
Start: 2024-07-01 | End: 2024-07-01

## 2024-07-01 RX ORDER — SODIUM CHLORIDE 9 MG/ML
INJECTION, SOLUTION INTRAVENOUS PRN
Status: DISCONTINUED | OUTPATIENT
Start: 2024-07-01 | End: 2024-07-01

## 2024-07-01 RX ORDER — KETOROLAC TROMETHAMINE 30 MG/ML
INJECTION, SOLUTION INTRAMUSCULAR; INTRAVENOUS PRN
Status: DISCONTINUED | OUTPATIENT
Start: 2024-07-01 | End: 2024-07-01 | Stop reason: SDUPTHER

## 2024-07-01 RX ORDER — CARBOPROST TROMETHAMINE 250 UG/ML
250 INJECTION, SOLUTION INTRAMUSCULAR PRN
Status: DISCONTINUED | OUTPATIENT
Start: 2024-07-01 | End: 2024-07-01

## 2024-07-01 RX ORDER — FENTANYL CITRATE 50 UG/ML
INJECTION, SOLUTION INTRAMUSCULAR; INTRAVENOUS PRN
Status: DISCONTINUED | OUTPATIENT
Start: 2024-07-01 | End: 2024-07-01 | Stop reason: SDUPTHER

## 2024-07-01 RX ORDER — MIDAZOLAM HYDROCHLORIDE 1 MG/ML
INJECTION INTRAMUSCULAR; INTRAVENOUS PRN
Status: DISCONTINUED | OUTPATIENT
Start: 2024-07-01 | End: 2024-07-01 | Stop reason: SDUPTHER

## 2024-07-01 RX ORDER — LABETALOL HYDROCHLORIDE 5 MG/ML
40 INJECTION, SOLUTION INTRAVENOUS
Status: DISCONTINUED | OUTPATIENT
Start: 2024-07-01 | End: 2024-07-01

## 2024-07-01 RX ORDER — KETOROLAC TROMETHAMINE 30 MG/ML
30 INJECTION, SOLUTION INTRAMUSCULAR; INTRAVENOUS EVERY 6 HOURS PRN
Status: DISCONTINUED | OUTPATIENT
Start: 2024-07-01 | End: 2024-07-01

## 2024-07-01 RX ORDER — NALOXONE HYDROCHLORIDE 0.4 MG/ML
INJECTION, SOLUTION INTRAMUSCULAR; INTRAVENOUS; SUBCUTANEOUS PRN
Status: DISCONTINUED | OUTPATIENT
Start: 2024-07-01 | End: 2024-07-01

## 2024-07-01 RX ORDER — KETOROLAC TROMETHAMINE 30 MG/ML
30 INJECTION, SOLUTION INTRAMUSCULAR; INTRAVENOUS EVERY 6 HOURS
Status: DISCONTINUED | OUTPATIENT
Start: 2024-07-01 | End: 2024-07-01

## 2024-07-01 RX ORDER — SODIUM CHLORIDE, SODIUM LACTATE, POTASSIUM CHLORIDE, CALCIUM CHLORIDE 600; 310; 30; 20 MG/100ML; MG/100ML; MG/100ML; MG/100ML
INJECTION, SOLUTION INTRAVENOUS CONTINUOUS
Status: DISCONTINUED | OUTPATIENT
Start: 2024-07-01 | End: 2024-07-01

## 2024-07-01 RX ORDER — PROPOFOL 10 MG/ML
INJECTION, EMULSION INTRAVENOUS PRN
Status: DISCONTINUED | OUTPATIENT
Start: 2024-07-01 | End: 2024-07-01 | Stop reason: SDUPTHER

## 2024-07-01 RX ORDER — SODIUM CHLORIDE 9 MG/ML
INJECTION, SOLUTION INTRAVENOUS PRN
Status: DISCONTINUED | OUTPATIENT
Start: 2024-07-01 | End: 2024-07-04 | Stop reason: HOSPADM

## 2024-07-01 RX ORDER — ONDANSETRON 4 MG/1
4 TABLET, ORALLY DISINTEGRATING ORAL EVERY 6 HOURS PRN
Status: DISCONTINUED | OUTPATIENT
Start: 2024-07-01 | End: 2024-07-01

## 2024-07-01 RX ORDER — TRANEXAMIC ACID 10 MG/ML
1000 INJECTION, SOLUTION INTRAVENOUS
Status: DISCONTINUED | OUTPATIENT
Start: 2024-07-01 | End: 2024-07-01

## 2024-07-01 RX ORDER — OXYCODONE HYDROCHLORIDE 5 MG/1
5 TABLET ORAL EVERY 4 HOURS PRN
Status: DISCONTINUED | OUTPATIENT
Start: 2024-07-02 | End: 2024-07-04 | Stop reason: HOSPADM

## 2024-07-01 RX ORDER — SODIUM CHLORIDE 0.9 % (FLUSH) 0.9 %
5-40 SYRINGE (ML) INJECTION PRN
Status: DISCONTINUED | OUTPATIENT
Start: 2024-07-01 | End: 2024-07-04 | Stop reason: HOSPADM

## 2024-07-01 RX ORDER — IBUPROFEN 800 MG/1
800 TABLET ORAL EVERY 8 HOURS
Status: DISCONTINUED | OUTPATIENT
Start: 2024-07-02 | End: 2024-07-01

## 2024-07-01 RX ORDER — IBUPROFEN 800 MG/1
800 TABLET ORAL EVERY 8 HOURS
Status: DISCONTINUED | OUTPATIENT
Start: 2024-07-02 | End: 2024-07-04 | Stop reason: HOSPADM

## 2024-07-01 RX ORDER — DIPHENHYDRAMINE HYDROCHLORIDE 50 MG/ML
12.5 INJECTION INTRAMUSCULAR; INTRAVENOUS EVERY 6 HOURS PRN
Status: DISCONTINUED | OUTPATIENT
Start: 2024-07-01 | End: 2024-07-01

## 2024-07-01 RX ORDER — SODIUM CHLORIDE, SODIUM LACTATE, POTASSIUM CHLORIDE, CALCIUM CHLORIDE 600; 310; 30; 20 MG/100ML; MG/100ML; MG/100ML; MG/100ML
INJECTION, SOLUTION INTRAVENOUS CONTINUOUS PRN
Status: DISCONTINUED | OUTPATIENT
Start: 2024-07-01 | End: 2024-07-01 | Stop reason: SDUPTHER

## 2024-07-01 RX ORDER — CEFAZOLIN SODIUM 1 G/3ML
INJECTION, POWDER, FOR SOLUTION INTRAMUSCULAR; INTRAVENOUS PRN
Status: DISCONTINUED | OUTPATIENT
Start: 2024-07-01 | End: 2024-07-01 | Stop reason: SDUPTHER

## 2024-07-01 RX ORDER — SODIUM CHLORIDE, SODIUM LACTATE, POTASSIUM CHLORIDE, AND CALCIUM CHLORIDE .6; .31; .03; .02 G/100ML; G/100ML; G/100ML; G/100ML
1000 INJECTION, SOLUTION INTRAVENOUS PRN
Status: DISCONTINUED | OUTPATIENT
Start: 2024-07-01 | End: 2024-07-01

## 2024-07-01 RX ORDER — HYDRALAZINE HYDROCHLORIDE 20 MG/ML
INJECTION INTRAMUSCULAR; INTRAVENOUS
Status: DISPENSED
Start: 2024-07-01 | End: 2024-07-02

## 2024-07-01 RX ORDER — SODIUM CHLORIDE 0.9 % (FLUSH) 0.9 %
5-40 SYRINGE (ML) INJECTION EVERY 12 HOURS SCHEDULED
Status: DISCONTINUED | OUTPATIENT
Start: 2024-07-01 | End: 2024-07-04 | Stop reason: HOSPADM

## 2024-07-01 RX ORDER — SODIUM CHLORIDE 0.9 % (FLUSH) 0.9 %
5-40 SYRINGE (ML) INJECTION EVERY 12 HOURS SCHEDULED
Status: DISCONTINUED | OUTPATIENT
Start: 2024-07-01 | End: 2024-07-01

## 2024-07-01 RX ORDER — ACETAMINOPHEN 500 MG
1000 TABLET ORAL EVERY 8 HOURS PRN
Status: DISCONTINUED | OUTPATIENT
Start: 2024-07-01 | End: 2024-07-01

## 2024-07-01 RX ORDER — LANOLIN 72 %
OINTMENT (GRAM) TOPICAL
Status: DISCONTINUED | OUTPATIENT
Start: 2024-07-01 | End: 2024-07-04 | Stop reason: HOSPADM

## 2024-07-01 RX ORDER — DOCUSATE SODIUM 100 MG/1
100 CAPSULE, LIQUID FILLED ORAL 2 TIMES DAILY
Status: DISCONTINUED | OUTPATIENT
Start: 2024-07-01 | End: 2024-07-04 | Stop reason: HOSPADM

## 2024-07-01 RX ORDER — POTASSIUM CHLORIDE 20 MEQ/1
40 TABLET, EXTENDED RELEASE ORAL ONCE
Status: DISCONTINUED | OUTPATIENT
Start: 2024-07-01 | End: 2024-07-01

## 2024-07-01 RX ORDER — BETAMETHASONE SODIUM PHOSPHATE AND BETAMETHASONE ACETATE 3; 3 MG/ML; MG/ML
12 INJECTION, SUSPENSION INTRA-ARTICULAR; INTRALESIONAL; INTRAMUSCULAR; SOFT TISSUE ONCE
Status: COMPLETED | OUTPATIENT
Start: 2024-07-01 | End: 2024-07-01

## 2024-07-01 RX ORDER — ROPIVACAINE HYDROCHLORIDE 2 MG/ML
10 INJECTION, SOLUTION EPIDURAL; INFILTRATION; PERINEURAL CONTINUOUS
Status: DISCONTINUED | OUTPATIENT
Start: 2024-07-01 | End: 2024-07-01

## 2024-07-01 RX ORDER — ONDANSETRON 2 MG/ML
4 INJECTION INTRAMUSCULAR; INTRAVENOUS EVERY 6 HOURS PRN
Status: DISCONTINUED | OUTPATIENT
Start: 2024-07-01 | End: 2024-07-04 | Stop reason: HOSPADM

## 2024-07-01 RX ORDER — METHYLERGONOVINE MALEATE 0.2 MG/ML
200 INJECTION INTRAVENOUS PRN
Status: DISCONTINUED | OUTPATIENT
Start: 2024-07-01 | End: 2024-07-01

## 2024-07-01 RX ORDER — DIPHENHYDRAMINE HYDROCHLORIDE 50 MG/ML
25 INJECTION INTRAMUSCULAR; INTRAVENOUS EVERY 6 HOURS PRN
Status: DISCONTINUED | OUTPATIENT
Start: 2024-07-01 | End: 2024-07-04 | Stop reason: HOSPADM

## 2024-07-01 RX ORDER — FENTANYL CITRATE 50 UG/ML
100 INJECTION, SOLUTION INTRAMUSCULAR; INTRAVENOUS
Status: DISCONTINUED | OUTPATIENT
Start: 2024-07-01 | End: 2024-07-01

## 2024-07-01 RX ORDER — MAGNESIUM SULFATE 4 G/50ML
INJECTION INTRAVENOUS
Status: DISCONTINUED
Start: 2024-07-01 | End: 2024-07-02

## 2024-07-01 RX ORDER — ENOXAPARIN SODIUM 100 MG/ML
40 INJECTION SUBCUTANEOUS EVERY 24 HOURS
Status: DISCONTINUED | OUTPATIENT
Start: 2024-07-02 | End: 2024-07-04 | Stop reason: HOSPADM

## 2024-07-01 RX ORDER — SODIUM CHLORIDE, SODIUM LACTATE, POTASSIUM CHLORIDE, CALCIUM CHLORIDE 600; 310; 30; 20 MG/100ML; MG/100ML; MG/100ML; MG/100ML
INJECTION, SOLUTION INTRAVENOUS CONTINUOUS
Status: DISCONTINUED | OUTPATIENT
Start: 2024-07-01 | End: 2024-07-04 | Stop reason: HOSPADM

## 2024-07-01 RX ORDER — MISOPROSTOL 200 UG/1
400 TABLET ORAL PRN
Status: DISCONTINUED | OUTPATIENT
Start: 2024-07-01 | End: 2024-07-01

## 2024-07-01 RX ORDER — LABETALOL HYDROCHLORIDE 5 MG/ML
20 INJECTION, SOLUTION INTRAVENOUS
Status: DISCONTINUED | OUTPATIENT
Start: 2024-07-01 | End: 2024-07-01

## 2024-07-01 RX ORDER — SODIUM CHLORIDE 0.9 % (FLUSH) 0.9 %
5-40 SYRINGE (ML) INJECTION PRN
Status: DISCONTINUED | OUTPATIENT
Start: 2024-07-01 | End: 2024-07-01

## 2024-07-01 RX ADMIN — MAGNESIUM SULFATE HEPTAHYDRATE 2000 MG/HR: 40 INJECTION, SOLUTION INTRAVENOUS at 21:24

## 2024-07-01 RX ADMIN — AZITHROMYCIN MONOHYDRATE 500 MG: 500 INJECTION, POWDER, LYOPHILIZED, FOR SOLUTION INTRAVENOUS at 11:54

## 2024-07-01 RX ADMIN — SODIUM CHLORIDE, POTASSIUM CHLORIDE, SODIUM LACTATE AND CALCIUM CHLORIDE: 600; 310; 30; 20 INJECTION, SOLUTION INTRAVENOUS at 04:13

## 2024-07-01 RX ADMIN — MAGNESIUM SULFATE HEPTAHYDRATE 4000 MG: 80 INJECTION, SOLUTION INTRAVENOUS at 10:18

## 2024-07-01 RX ADMIN — KETOROLAC TROMETHAMINE 30 MG: 30 INJECTION, SOLUTION INTRAMUSCULAR; INTRAVENOUS at 20:05

## 2024-07-01 RX ADMIN — POTASSIUM CHLORIDE 40 MEQ: 1500 TABLET, EXTENDED RELEASE ORAL at 20:04

## 2024-07-01 RX ADMIN — KETOROLAC TROMETHAMINE 20 MG: 30 INJECTION, SOLUTION INTRAMUSCULAR; INTRAVENOUS at 12:10

## 2024-07-01 RX ADMIN — MORPHINE SULFATE 2 MG: 0.5 INJECTION, SOLUTION EPIDURAL; INTRATHECAL; INTRAVENOUS at 11:56

## 2024-07-01 RX ADMIN — FENTANYL CITRATE 100 MCG: 50 INJECTION, SOLUTION INTRAMUSCULAR; INTRAVENOUS at 11:33

## 2024-07-01 RX ADMIN — AMPICILLIN SODIUM 2000 MG: 2 INJECTION, POWDER, FOR SOLUTION INTRAVENOUS at 04:16

## 2024-07-01 RX ADMIN — MIDAZOLAM 4 MG: 1 INJECTION INTRAMUSCULAR; INTRAVENOUS at 11:41

## 2024-07-01 RX ADMIN — SODIUM CHLORIDE, PRESERVATIVE FREE 10 ML: 5 INJECTION INTRAVENOUS at 22:08

## 2024-07-01 RX ADMIN — LIDOCAINE HYDROCHLORIDE,EPINEPHRINE BITARTRATE 5 ML: 20; .005 INJECTION, SOLUTION EPIDURAL; INFILTRATION; INTRACAUDAL; PERINEURAL at 11:32

## 2024-07-01 RX ADMIN — Medication 909 ML/HR: at 11:42

## 2024-07-01 RX ADMIN — ACETAMINOPHEN 1000 MG: 500 TABLET ORAL at 10:02

## 2024-07-01 RX ADMIN — ACETAMINOPHEN 1000 MG: 500 TABLET ORAL at 18:04

## 2024-07-01 RX ADMIN — ROPIVACAINE HYDROCHLORIDE 10 ML/HR: 2 INJECTION, SOLUTION EPIDURAL; INFILTRATION at 04:57

## 2024-07-01 RX ADMIN — PROPOFOL 50 MG: 10 INJECTION, EMULSION INTRAVENOUS at 11:41

## 2024-07-01 RX ADMIN — AMPICILLIN 1000 MG: 1 INJECTION, POWDER, FOR SOLUTION INTRAMUSCULAR; INTRAVENOUS at 08:51

## 2024-07-01 RX ADMIN — HYDRALAZINE HYDROCHLORIDE 5 MG: 20 INJECTION INTRAMUSCULAR; INTRAVENOUS at 10:08

## 2024-07-01 RX ADMIN — SODIUM CHLORIDE, POTASSIUM CHLORIDE, SODIUM LACTATE AND CALCIUM CHLORIDE: 600; 310; 30; 20 INJECTION, SOLUTION INTRAVENOUS at 22:04

## 2024-07-01 RX ADMIN — LIDOCAINE HYDROCHLORIDE,EPINEPHRINE BITARTRATE 5 ML: 20; .005 INJECTION, SOLUTION EPIDURAL; INFILTRATION; INTRACAUDAL; PERINEURAL at 11:30

## 2024-07-01 RX ADMIN — DIPHENHYDRAMINE HYDROCHLORIDE 25 MG: 50 INJECTION INTRAMUSCULAR; INTRAVENOUS at 22:04

## 2024-07-01 RX ADMIN — MORPHINE SULFATE 3 MG: 0.5 INJECTION, SOLUTION EPIDURAL; INTRATHECAL; INTRAVENOUS at 11:56

## 2024-07-01 RX ADMIN — WATER 2000 MG: 1 INJECTION INTRAMUSCULAR; INTRAVENOUS; SUBCUTANEOUS at 20:04

## 2024-07-01 RX ADMIN — BETAMETHASONE SODIUM PHOSPHATE AND BETAMETHASONE ACETATE 12 MG: 3; 3 INJECTION, SUSPENSION INTRA-ARTICULAR; INTRALESIONAL; INTRAMUSCULAR at 04:10

## 2024-07-01 RX ADMIN — ROPIVACAINE HYDROCHLORIDE 10 ML: 2 INJECTION, SOLUTION EPIDURAL; INFILTRATION at 04:53

## 2024-07-01 RX ADMIN — MAGNESIUM SULFATE HEPTAHYDRATE 2000 MG/HR: 40 INJECTION, SOLUTION INTRAVENOUS at 10:39

## 2024-07-01 RX ADMIN — Medication 1 MILLI-UNITS/MIN: at 10:00

## 2024-07-01 RX ADMIN — SODIUM CHLORIDE, POTASSIUM CHLORIDE, SODIUM LACTATE AND CALCIUM CHLORIDE: 600; 310; 30; 20 INJECTION, SOLUTION INTRAVENOUS at 11:31

## 2024-07-01 RX ADMIN — CEFAZOLIN 2 G: 1 INJECTION, POWDER, FOR SOLUTION INTRAMUSCULAR; INTRAVENOUS; PARENTERAL at 12:26

## 2024-07-01 RX ADMIN — LIDOCAINE HYDROCHLORIDE,EPINEPHRINE BITARTRATE 5 ML: 20; .005 INJECTION, SOLUTION EPIDURAL; INFILTRATION; INTRACAUDAL; PERINEURAL at 11:34

## 2024-07-01 RX ADMIN — ONDANSETRON 4 MG: 2 INJECTION INTRAMUSCULAR; INTRAVENOUS at 18:53

## 2024-07-01 ASSESSMENT — PAIN DESCRIPTION - ORIENTATION: ORIENTATION: LOWER

## 2024-07-01 ASSESSMENT — PAIN SCALES - GENERAL
PAINLEVEL_OUTOF10: 0
PAINLEVEL_OUTOF10: 3
PAINLEVEL_OUTOF10: 6
PAINLEVEL_OUTOF10: 0

## 2024-07-01 ASSESSMENT — PAIN DESCRIPTION - LOCATION: LOCATION: ABDOMEN;INCISION

## 2024-07-01 ASSESSMENT — PAIN - FUNCTIONAL ASSESSMENT: PAIN_FUNCTIONAL_ASSESSMENT: ACTIVITIES ARE NOT PREVENTED

## 2024-07-01 ASSESSMENT — PAIN DESCRIPTION - DESCRIPTORS: DESCRIPTORS: ACHING;CRAMPING;DISCOMFORT

## 2024-07-01 NOTE — PROGRESS NOTES
FHR tracing for baby A was delivered secondary to the FHR tracing for baby B. This is verified due to which baby had FSE and ruptured sac.

## 2024-07-01 NOTE — ANESTHESIA POSTPROCEDURE EVALUATION
Department of Anesthesiology  Postprocedure Note    Patient: Andrew Hassan  MRN: [id number]  YOB: 1995  Date of evaluation: 2024    Procedure Summary       Date: 24 Room / Location: [address]    Anesthesia Start: 442 Anesthesia Stop: 1308    Procedures:        SECTION (Abdomen)      Labor Analgesia Diagnosis:        delivery delivered      ( delivery delivered [O82])    Surgeons: Martha Mazariegos DO Responsible Provider: Bin Reeves MD    Anesthesia Type: Regional, Epidural ASA Status: 2            Anesthesia Type: Regional, Epidural    Serina Phase I: Serina Score: 9    Serina Phase II:      Anesthesia Post Evaluation    Patient location during evaluation: PACU  Patient participation: complete - patient participated  Level of consciousness: awake  Pain score: 2  Airway patency: patent  Nausea & Vomiting: no vomiting and no nausea  Cardiovascular status: blood pressure returned to baseline  Respiratory status: acceptable  Hydration status: euvolemic  Multimodal analgesia pain management approach  Pain management: adequate    No notable events documented.

## 2024-07-01 NOTE — PROGRESS NOTES
Decision for stat  was made due to fetal intolerance and bradycardia. Education provided to patient. Dr. Mazariegos stated the surgery was emergent, and did not need to do a vag prep or counts. Fetal pillow was placed. No timeout was done due to the emergent status. Post op X ray orders were obtained and IV antibiotics orders received. Stat  without time for standard abdominal prep, orders for betadine solution to be poured.

## 2024-07-01 NOTE — PROGRESS NOTES
Dr. Mazariegos preformed SVE difficult to assess due to bulging bag of water. Orders to move pt to the OR onto operating table to AROM and anticipate delivery.

## 2024-07-01 NOTE — PROGRESS NOTES
Delivery of two viable babies at this time via . Dr. Mazariegos remained at bedside while in the OR until after the closing of .

## 2024-07-01 NOTE — SUBJECTIVE & OBJECTIVE
Department of Obstetrics and Gynecology   Obstetrics History and Physical        CHIEF COMPLAINT:  contractions    HISTORY OF PRESENT ILLNESS:      The patient is a 28 y.o. female at 34w5d.  OB History          1    Para        Term                AB        Living             SAB        IAB        Ectopic        Molar        Multiple        Live Births                Patient presents with a chief complaint as above and is being admitted for latent labor    Estimated Due Date: Estimated Date of Delivery: 24    PRENATAL CARE:    Complicated by: mono/di twins    PAST OB HISTORY  OB History          1    Para        Term                AB        Living             SAB        IAB        Ectopic        Molar        Multiple        Live Births                    Past Medical History:        Diagnosis Date    Anemia     Dysmenorrhea     Nausea     Pica     Pregnant      Past Surgical History:    No past surgical history on file.  Allergies:  Patient has no known allergies.  Social History:    Social History     Socioeconomic History    Marital status: Unknown     Spouse name: Not on file    Number of children: Not on file    Years of education: Not on file    Highest education level: Not on file   Occupational History    Not on file   Tobacco Use    Smoking status: Never    Smokeless tobacco: Never   Vaping Use    Vaping Use: Never used   Substance and Sexual Activity    Alcohol use: Not Currently    Drug use: Never    Sexual activity: Yes   Other Topics Concern    Not on file   Social History Narrative    Not on file     Social Determinants of Health     Financial Resource Strain: Medium Risk (2024)    Overall Financial Resource Strain (CARDIA)     Difficulty of Paying Living Expenses: Somewhat hard   Food Insecurity: Food Insecurity Present (2024)    Hunger Vital Sign     Worried About Running Out of Food in the Last Year: Sometimes true     Ran Out of Food in the Last Year:

## 2024-07-01 NOTE — PROGRESS NOTES
Department of Obstetrics and Gynecology  Labor and Delivery   Attending Progress Note    Name:  Andrew Hassan   : 1995   CSN: [id number]     SUBJECTIVE:  Patient taken back to OR for delivery. Reports she is doing well.     OBJECTIVE:      Blood pressure 123/84, pulse 65, temperature 98.5 °F (36.9 °C), temperature source Oral, resp. rate 22, last menstrual period 2023, SpO2 100 %.           ASSESSMENT AND PLAN  IUP at 34w5d weeks admitted for onset of  labor with mono Di twins   -Ampicillin for GBS unknown status   -S/p Celestone    Called by CNM in regards to patient being a lip/100/-1.  Upon arrival patient moved to OR for delivery.  Discussed with patient possibility of an emergent  section secondary to fetal intolerance to labor.  Discussed with her we would attempt vaginal delivery as both babies were vertex vertex on ultrasound.  Patient expressed understanding and desire to proceed with attempted  knowing possibility of emergent  section.  Patient signed consent form for possible . AROM performed in the operating room with approximately 300 cc of clear amniotic fluid noted.  FSE was placed on baby A due to difficulty monitoring.  Patient denied history of HSV, herpes, hepatitis.  Baby B monitored with external monitoring.  Patient with development of severe headache in the operating room that she rated 7/10.  Tylenol given.  Patient with 2 consecutive severe range blood pressures requiring treatment with 5 mg IV hydralazine.  Patient met criteria for preeclampsia with severe features and 4 g of magnesium sulfate were administered for bolus followed by 2 g an hour.  Pitocin was also started at 1 milliunit in the OR due to spacing out of contractions.  Patient progressed to 10/100/-1.  At 1041 patient began pushing with minimal movement of fetal head. At approximately 1116 Baby B with noted bradycardia into the 60s without resolution despite supplemental

## 2024-07-01 NOTE — PLAN OF CARE
Problem: Safety - Adult  Goal: Free from fall injury  Outcome: Progressing     Problem: Skin/Tissue Integrity  Goal: Absence of new skin breakdown  Description: 1.  Monitor for areas of redness and/or skin breakdown  2.  Assess vascular access sites hourly  3.  Every 4-6 hours minimum:  Change oxygen saturation probe site  4.  Every 4-6 hours:  If on nasal continuous positive airway pressure, respiratory therapy assess nares and determine need for appliance change or resting period.  Outcome: Progressing     Problem: Risk for Maternal Injury  Goal: Remains free from seizures and injury related to seizure activity  Description: INTERVENTIONS:.  -Maintain airway, patient safety and administer oxygen as ordered  -Monitor patient for seizure activity, document and report duration and descrition  -If Seizure occurs, turn patient to dide and suction secretions as needed  -Reorient patient post seizure  -Seizure Pads  -Instruct patient/family to notify RN of any seizure activity  -Instruct patient/family to call for assistance with activity based on assessment  Outcome: Progressing     Problem: Risk for Decreased Cardiac Output  Goal: Maintains optimal cardiac output and hemodynamic stability  Description: INTERVENTIONS:.  -Monitor blood pressure and heart rate  -Monitor urine output and notify licensed practitioner for values outside of   -Assess for signes of decreased cardiac output  -Administer fluid and /or volume expanders as ordered    Outcome: Progressing  Goal: Achieves optimal ventilation and oxygenation  Description: INTERVENTIONS:.  -Assess for changes in respiratory status   -Assess for changes in mentation and behavior  -Position to facilitate oxygenation and minimize respiratory effort  -Oxygen supplementation based on oxygen saturation or arterial blood gases  -Initiate smoking cessation protocol as indicated  -Encourage broncho-pulmonary hygiene including cough, deep breathe, incentive

## 2024-07-01 NOTE — H&P
Department of Obstetrics and Gynecology   Obstetrics History and Physical        CHIEF COMPLAINT:  contractions    HISTORY OF PRESENT ILLNESS:      The patient is a 28 y.o. female at 34w5d.  OB History          1    Para        Term                AB        Living             SAB        IAB        Ectopic        Molar        Multiple        Live Births                Patient presents with a chief complaint as above and is being admitted for  labor    Estimated Due Date: Estimated Date of Delivery: 24    PRENATAL CARE:    Complicated by: mono/di twins    PAST OB HISTORY  OB History          1    Para        Term                AB        Living             SAB        IAB        Ectopic        Molar        Multiple        Live Births                    Past Medical History:        Diagnosis Date    Anemia     Dysmenorrhea     Nausea     Pica     Pregnant      Past Surgical History:    No past surgical history on file.  Allergies:  Patient has no known allergies.  Social History:    Social History     Socioeconomic History    Marital status: Unknown     Spouse name: Not on file    Number of children: Not on file    Years of education: Not on file    Highest education level: Not on file   Occupational History    Not on file   Tobacco Use    Smoking status: Never    Smokeless tobacco: Never   Vaping Use    Vaping Use: Never used   Substance and Sexual Activity    Alcohol use: Not Currently    Drug use: Never    Sexual activity: Yes   Other Topics Concern    Not on file   Social History Narrative    Not on file     Social Determinants of Health     Financial Resource Strain: Medium Risk (2024)    Overall Financial Resource Strain (CARDIA)     Difficulty of Paying Living Expenses: Somewhat hard   Food Insecurity: Food Insecurity Present (2024)    Hunger Vital Sign     Worried About Running Out of Food in the Last Year: Sometimes true     Ran Out of Food in the Last Year:  Sometimes true   Transportation Needs: Unknown (1/24/2024)    PRAPARE - Transportation     Lack of Transportation (Medical): Not on file     Lack of Transportation (Non-Medical): No   Physical Activity: Not on file   Stress: Not on file   Social Connections: Not on file   Intimate Partner Violence: Not on file   Housing Stability: Unknown (1/24/2024)    Housing Stability Vital Sign     Unable to Pay for Housing in the Last Year: Not on file     Number of Places Lived in the Last Year: Not on file     Unstable Housing in the Last Year: No     Family History:   No family history on file.  Medications Prior to Admission:  Medications Prior to Admission: ondansetron (ZOFRAN-ODT) 4 MG disintegrating tablet, Take 1 tablet by mouth 3 times daily as needed for Nausea or Vomiting (Patient not taking: Reported on 5/15/2024)  Ferrous Sulfate (IRON) 325 (65 Fe) MG TABS, Take by mouth (Patient not taking: Reported on 5/1/2024)  Acetaminophen (TYLENOL) 325 MG CAPS, Take by mouth (Patient not taking: Reported on 5/1/2024)  Prenatal MV-Min-FA-Omega-3 (PRENATAL GUMMIES/DHA & FA) 0.4-32.5 MG CHEW, Take 1 tablet by mouth daily (Patient not taking: Reported on 6/26/2024)    REVIEW OF SYSTEMS:    CONSTITUTIONAL:  negative  RESPIRATORY:  negative  CARDIOVASCULAR:  negative  GASTROINTESTINAL:  negative  ALLERGIC/IMMUNOLOGIC:  negative  NEUROLOGICAL:  negative  BEHAVIOR/PSYCH:  negative    PHYSICAL EXAM:  Blood pressure 137/80, pulse 56, resp. rate 18, last menstrual period 11/01/2023, SpO2 100 %.  General appearance:  awake, alert, cooperative, no apparent distress, and appears stated age  Neurologic:  Awake, alert, oriented to name, place and time.    Lungs:  No increased work of breathing, good air exchange  Abdomen:  Soft, non tender, gravid  Fetal heart rate:    Category 1 x 2    Pelvis:  Adequate pelvis  Cervix: 3/90/-2    Contraction frequency:  5 minutes    Membranes:  Intact    ASSESSMENT AND PLAN:    IUP at 34 w 5d with mono/di

## 2024-07-01 NOTE — PROGRESS NOTES
I have reviewed the patient's chart and H/P and first assisted on this patient's  delivery.     SHAILESH Kearns CNM

## 2024-07-01 NOTE — PROGRESS NOTES
Department of Obstetrics and Gynecology  Labor and Delivery   Midwifery Progress Note      SUBJECTIVE:  Andrew is comfortable with epidural.     OBJECTIVE:      Fetal heart rate A:       Baseline Heart Rate:  130        Accelerations:  present       Variability:  moderate       Decelerations:  absent         Fetal heart rate B:       Baseline Heart Rate:  125        Accelerations:  present       Variability:  moderate       Decelerations:  absent       Contraction frequency: 2-3 minutes    Membranes:  Intact    Cervix:         Dilation:  Rim         Effacement:  100%         Station:  -1         Position:  anterior             ASSESSMENT     27 yo  at 34.5 weeks  Cat I FHR tracing x 2  Mono/di twins   GBS unknown    labor     PLAN:    Dr. Mazariegos notified and en route.     Anticipate labor progress and .     Will plan to deliver in the OR; per Dr. Mazariegos, can keep in the labor room at this time.       Time Spent: 10 min    SHAILESH Kearns CNM

## 2024-07-01 NOTE — PROGRESS NOTES
Severe range Bps noted while in the OR waiting for pt to be fully dilated. Pt also reporting 7-8/10 HA. Will give tylenol and also treat with MgSO4 and IV hydralazine for severe range BP.     SHAILESH Kearns - UZAIR

## 2024-07-01 NOTE — ANESTHESIA PROCEDURE NOTES
Epidural Block    Patient location during procedure: OB  Start time: 7/1/2024 5:42 AM  End time: 7/1/2024 5:45 AM  Reason for block: labor epidural  Staffing  Performed: resident/CRNA   Resident/CRNA: Will King APRN - CRNA  Performed by: Will King APRN - CRNA  Authorized by: Valencia Reyes MD    Epidural  Patient position: sitting  Prep: ChloraPrep and site prepped and draped  Patient monitoring: continuous pulse ox and frequent blood pressure checks  Approach: midline  Location: L3-4  Injection technique: DWAYNE saline  Provider prep: mask and sterile gloves  Needle  Needle type: Tuohy   Needle gauge: 17 G  Needle length: 3.5 in  Needle insertion depth: 4 cm  Catheter type: side hole  Catheter size: 19 G  Catheter at skin depth: 10 cm  Test dose: negativeCatheter Secured: tegaderm and tape  Assessment  Sensory level: T8  Hemodynamics: stable  Attempts: 1  Outcomes: uncomplicated and patient tolerated procedure well  Preanesthetic Checklist  Completed: patient identified, IV checked, site marked, risks and benefits discussed, surgical/procedural consents, equipment checked, pre-op evaluation, timeout performed, anesthesia consent given, oxygen available, monitors applied/VS acknowledged, fire risk safety assessment completed and verbalized and blood product R/B/A discussed and consented

## 2024-07-01 NOTE — OP NOTE
Department of Obstetrics and Gynecology  Labor and Delivery  Operative Report  Name:  Andrew Hassan   CSN: [id number]   Attending Provider: Martha Mazariegos DO  Location:  OBOR1/[facility]-1   : 1995   Age: 28 y.o.     Section Operative Report    Date of surgery:   2024      Surgeon:   Martha Mazariegos DO    First Assistant:   SHAILESH Kearns-UZAIR  The use of a first assistant was necessary for the proper positioning, prepping, and draping of the patient, as well as the safe and expeditious execution of the case and closure of skin and subcutaneous tissues.    Pre-op Diagnosis:   1. 34w5d IUP  2. Fetal intolerance to labor  3.  labor  4. Mono Di twins  5. Preeclampsia with severe features  6. Depression in pregnancy    Post-op Diagnosis:   1. 34w5d IUP  2. Fetal intolerance to labor  3.  labor  4. Mono Di twins  5. Preeclampsia with severe features  6. Depression in pregnancy  7. Live male infant, 5lb 2.8oz, APGARs 8, 9  8. Live male infant, 5lb 1.2oz, APGARs, 9,9      Procedure:   PLTCS    Complications:   None    Anesthesia:   Epidural    QBL:  960 cc    Specimen:  Baby A cord blood, cord gasses  Baby B cord blood, cord gasses  Placenta    Indication:   The patient is a 28 y.o.  @ 34w5d presented for onset of  labor with mono Di twins.  Babies were noted to be vertex vertex.  Patient desired to try a vaginal delivery.  Patient progressed to 10 cm, she pushed for approximately half an hour with fetal bradycardia of Baby A and Baby B unresolved with position changes, supplemental O2 and pitocin being turned off. Recommendation was made to proceed with primary  section due to fetal intolerance to labor.    Findings: Uterus, tubes and ovaries consistent with normal gravid state. Tubes and ovaries freely movable without evidence of adhesions or endometriosis      Procedure in Detail: Patient was already in the operating room and had been pushing for approximately half  an hour.  Fetal bradycardia was noted of baby A and baby B that was unresolved with position changes, supplemental oxygen and Pitocin being turned off.  Recommendation was made to convert to primary  section.  2 g of Ancef and 500 mg of azithromycin were ordered for prophylaxis. Fetal pillow was placed vaginally. Betadine was splashed on the abdomen. Anesthesia was then noted to be adequate. A pfannensteil skin incision was created sharply two finger breadths above the pubic symphysis and incision was carried down to the underlying layer of fascia.  The fascia was incised in the midline and the incision was extended laterally with the Troncoso scissors.  The superior aspect of the fascial incision was then grasped with the Kocher clamps, elevated and the underlying rectus muscles dissected off sharply.  Attention was then turned to the inferior aspect of the fascia which, in a similar fashion, was grasped with the Kocher clamps, elevated, and the underlying rectus muscles dissected off sharply.  The rectus muscles were then  in the midline.  The peritoneum was identified and entered bluntly.  This incision was then extended superiorly and inferiorly with blunt stretching.  An Brayn retractor was then positioned.      The lower uterine segment was incised in a transverse fashion with the scalpel.  The uterine incision was then extended laterally bluntly.  Baby B's head was delivered atraumatically, 1 loose nuchal cord was reduced.  The nose and mouth were bulb suctioned and the cord was clamped and cut.  The infant was handed off to the awaiting  team. Baby A's head was then delivered atraumatically.  Nose and mouth were bulb suctioned and cord was clamped and cut.  Infant was then handed off to  team.  Cord gasses were sent for Baby A and Baby B.    The placenta was then expressed.  The uterus was cleared of all clots and debris.  Pitocin was given IV.  The uterine incision was repaired

## 2024-07-01 NOTE — ANESTHESIA PRE PROCEDURE
Department of Anesthesiology  Preprocedure Note       Name:  Andrew Hassan   Age:  28 y.o.  :  1995                                          MRN:  [id number]         Date:  2024      Surgeon: * No surgeons listed *    Procedure: * No procedures listed *    Medications prior to admission:   Prior to Admission medications    Medication Sig Start Date End Date Taking? Authorizing Provider   ondansetron (ZOFRAN-ODT) 4 MG disintegrating tablet Take 1 tablet by mouth 3 times daily as needed for Nausea or Vomiting  Patient not taking: Reported on 5/15/2024 4/17/24   Francis Bowens MD   Ferrous Sulfate (IRON) 325 (65 Fe) MG TABS Take by mouth  Patient not taking: Reported on 2024    Aguilar Tapia MD   Acetaminophen (TYLENOL) 325 MG CAPS Take by mouth  Patient not taking: Reported on 2024    Aguilar Tapia MD   Prenatal MV-Min-FA-Omega-3 (PRENATAL GUMMIES/DHA & FA) 0.4-32.5 MG CHEW Take 1 tablet by mouth daily  Patient not taking: Reported on 2024   Francis Bowens MD       Current medications:    Current Facility-Administered Medications   Medication Dose Route Frequency Provider Last Rate Last Admin    lactated ringers IV soln infusion   IntraVENous Continuous [place], Afshin Cooper  mL/hr at 24 0413 New Bag at 24 0413    lactated ringers bolus 500 mL  500 mL IntraVENous PRN Afshin Moran MD        Or    lactated ringers bolus 1,000 mL  1,000 mL IntraVENous PRN Afshin Moran MD        methylergonovine (METHERGINE) injection 200 mcg  200 mcg IntraMUSCular PRN Afshin Moran MD        carboprost (HEMABATE) injection 250 mcg  250 mcg IntraMUSCular PRN Afshin Moran MD        miSOPROStol (CYTOTEC) tablet 400 mcg  400 mcg Buccal PRN Afshin Moran MD        tranexamic acid-NaCl IVPB premix 1,000 mg  1,000 mg IntraVENous Once PRN Afshin Moran MD        oxytocin (PITOCIN) 30 units in 500 mL

## 2024-07-01 NOTE — PROGRESS NOTES
Pt arrives to unit via EMS from [facility] for c/o contractions since 0000, took Tylenol with no relief, contractions were 5-10 min apart but became increasingly closer and more intense.  Pt reports positive FM, denies bleeding or leaking of fluid    FFN collected, SVE 3/80/-1, ctx's moderate to palpation, pt visibly uncomfortable.    TR to , updated on patient arrival and SVE, new orders given to admit to labor pathway, see MAR

## 2024-07-02 LAB
ALBUMIN SERPL-MCNC: 2.5 GM/DL (ref 3.4–5)
ALP BLD-CCNC: 167 IU/L (ref 40–129)
ALT SERPL-CCNC: 19 U/L (ref 10–40)
ANION GAP SERPL CALCULATED.3IONS-SCNC: 9 MMOL/L (ref 7–16)
AST SERPL-CCNC: 27 IU/L (ref 15–37)
BILIRUB SERPL-MCNC: 0.2 MG/DL (ref 0–1)
BUN SERPL-MCNC: 3 MG/DL (ref 6–23)
CALCIUM SERPL-MCNC: 7.1 MG/DL (ref 8.3–10.6)
CHLORIDE BLD-SCNC: 106 MMOL/L (ref 99–110)
CO2: 23 MMOL/L (ref 21–32)
CREAT SERPL-MCNC: 0.7 MG/DL (ref 0.6–1.1)
GFR, ESTIMATED: >90 ML/MIN/1.73M2
GLUCOSE SERPL-MCNC: 96 MG/DL (ref 70–99)
HCT VFR BLD CALC: 20.3 % (ref 37–47)
HCT VFR BLD CALC: 23.7 % (ref 37–47)
HCT VFR BLD CALC: 26.4 % (ref 37–47)
HEMOGLOBIN: 6.7 GM/DL (ref 12.5–16)
HEMOGLOBIN: 7.7 GM/DL (ref 12.5–16)
HEMOGLOBIN: 8.5 GM/DL (ref 12.5–16)
MAGNESIUM: 4 MG/DL (ref 1.8–2.4)
MCH RBC QN AUTO: 27.4 PG (ref 27–31)
MCHC RBC AUTO-ENTMCNC: 32.5 % (ref 32–36)
MCV RBC AUTO: 84.3 FL (ref 78–100)
PDW BLD-RTO: 13.6 % (ref 11.7–14.9)
PLATELET # BLD: 211 K/CU MM (ref 140–440)
PMV BLD AUTO: 12.7 FL (ref 7.5–11.1)
POTASSIUM SERPL-SCNC: 4 MMOL/L (ref 3.5–5.1)
RBC # BLD: 2.81 M/CU MM (ref 4.2–5.4)
SODIUM BLD-SCNC: 138 MMOL/L (ref 135–145)
TOTAL PROTEIN: 4.5 GM/DL (ref 6.4–8.2)
WBC # BLD: 11.3 K/CU MM (ref 4–10.5)

## 2024-07-02 PROCEDURE — 30233N1 TRANSFUSION OF NONAUTOLOGOUS RED BLOOD CELLS INTO PERIPHERAL VEIN, PERCUTANEOUS APPROACH: ICD-10-PCS | Performed by: OBSTETRICS & GYNECOLOGY

## 2024-07-02 PROCEDURE — 85018 HEMOGLOBIN: CPT

## 2024-07-02 PROCEDURE — 2580000003 HC RX 258: Performed by: ADVANCED PRACTICE MIDWIFE

## 2024-07-02 PROCEDURE — 6360000002 HC RX W HCPCS: Performed by: STUDENT IN AN ORGANIZED HEALTH CARE EDUCATION/TRAINING PROGRAM

## 2024-07-02 PROCEDURE — 6370000000 HC RX 637 (ALT 250 FOR IP)

## 2024-07-02 PROCEDURE — 85027 COMPLETE CBC AUTOMATED: CPT

## 2024-07-02 PROCEDURE — 6370000000 HC RX 637 (ALT 250 FOR IP): Performed by: ADVANCED PRACTICE MIDWIFE

## 2024-07-02 PROCEDURE — P9016 RBC LEUKOCYTES REDUCED: HCPCS

## 2024-07-02 PROCEDURE — 36430 TRANSFUSION BLD/BLD COMPNT: CPT

## 2024-07-02 PROCEDURE — 6360000002 HC RX W HCPCS: Performed by: ADVANCED PRACTICE MIDWIFE

## 2024-07-02 PROCEDURE — 1220000000 HC SEMI PRIVATE OB R&B

## 2024-07-02 PROCEDURE — APPNB30 APP NON BILLABLE TIME 0-30 MINS

## 2024-07-02 PROCEDURE — 2580000003 HC RX 258: Performed by: STUDENT IN AN ORGANIZED HEALTH CARE EDUCATION/TRAINING PROGRAM

## 2024-07-02 PROCEDURE — 6360000002 HC RX W HCPCS: Performed by: OBSTETRICS & GYNECOLOGY

## 2024-07-02 PROCEDURE — 85014 HEMATOCRIT: CPT

## 2024-07-02 PROCEDURE — 6360000002 HC RX W HCPCS

## 2024-07-02 PROCEDURE — 80053 COMPREHEN METABOLIC PANEL: CPT

## 2024-07-02 PROCEDURE — 83735 ASSAY OF MAGNESIUM: CPT

## 2024-07-02 RX ORDER — SODIUM CHLORIDE 9 MG/ML
INJECTION, SOLUTION INTRAVENOUS PRN
Status: DISCONTINUED | OUTPATIENT
Start: 2024-07-02 | End: 2024-07-04 | Stop reason: HOSPADM

## 2024-07-02 RX ORDER — DIPHENHYDRAMINE HYDROCHLORIDE 50 MG/ML
50 INJECTION INTRAMUSCULAR; INTRAVENOUS ONCE
Status: COMPLETED | OUTPATIENT
Start: 2024-07-02 | End: 2024-07-02

## 2024-07-02 RX ORDER — FERROUS SULFATE 325(65) MG
325 TABLET ORAL 2 TIMES DAILY WITH MEALS
Status: DISCONTINUED | OUTPATIENT
Start: 2024-07-02 | End: 2024-07-04 | Stop reason: HOSPADM

## 2024-07-02 RX ADMIN — SODIUM CHLORIDE, PRESERVATIVE FREE 10 ML: 5 INJECTION INTRAVENOUS at 01:55

## 2024-07-02 RX ADMIN — OXYCODONE HYDROCHLORIDE 10 MG: 5 TABLET ORAL at 13:09

## 2024-07-02 RX ADMIN — MAGNESIUM SULFATE HEPTAHYDRATE 2000 MG/HR: 40 INJECTION, SOLUTION INTRAVENOUS at 08:27

## 2024-07-02 RX ADMIN — WATER 2000 MG: 1 INJECTION INTRAMUSCULAR; INTRAVENOUS; SUBCUTANEOUS at 10:18

## 2024-07-02 RX ADMIN — WATER 2000 MG: 1 INJECTION INTRAMUSCULAR; INTRAVENOUS; SUBCUTANEOUS at 04:00

## 2024-07-02 RX ADMIN — DOCUSATE SODIUM 100 MG: 100 CAPSULE, LIQUID FILLED ORAL at 20:57

## 2024-07-02 RX ADMIN — KETOROLAC TROMETHAMINE 30 MG: 30 INJECTION, SOLUTION INTRAMUSCULAR; INTRAVENOUS at 01:54

## 2024-07-02 RX ADMIN — ENOXAPARIN SODIUM 40 MG: 100 INJECTION SUBCUTANEOUS at 01:55

## 2024-07-02 RX ADMIN — ACETAMINOPHEN 1000 MG: 500 TABLET ORAL at 13:10

## 2024-07-02 RX ADMIN — ENOXAPARIN SODIUM 40 MG: 100 INJECTION SUBCUTANEOUS at 23:56

## 2024-07-02 RX ADMIN — OXYCODONE HYDROCHLORIDE 10 MG: 5 TABLET ORAL at 21:43

## 2024-07-02 RX ADMIN — SODIUM CHLORIDE, PRESERVATIVE FREE 10 ML: 5 INJECTION INTRAVENOUS at 20:58

## 2024-07-02 RX ADMIN — DIPHENHYDRAMINE HYDROCHLORIDE 50 MG: 50 INJECTION, SOLUTION INTRAMUSCULAR; INTRAVENOUS at 10:18

## 2024-07-02 RX ADMIN — ACETAMINOPHEN 1000 MG: 500 TABLET ORAL at 21:41

## 2024-07-02 RX ADMIN — DOCUSATE SODIUM 100 MG: 100 CAPSULE, LIQUID FILLED ORAL at 10:18

## 2024-07-02 RX ADMIN — FERROUS SULFATE TAB 325 MG (65 MG ELEMENTAL FE) 325 MG: 325 (65 FE) TAB at 18:50

## 2024-07-02 RX ADMIN — ACETAMINOPHEN 1000 MG: 500 TABLET ORAL at 01:54

## 2024-07-02 RX ADMIN — WATER 2000 MG: 1 INJECTION INTRAMUSCULAR; INTRAVENOUS; SUBCUTANEOUS at 20:57

## 2024-07-02 ASSESSMENT — PAIN DESCRIPTION - ONSET: ONSET: GRADUAL

## 2024-07-02 ASSESSMENT — PAIN SCALES - GENERAL
PAINLEVEL_OUTOF10: 8
PAINLEVEL_OUTOF10: 7
PAINLEVEL_OUTOF10: 0
PAINLEVEL_OUTOF10: 2
PAINLEVEL_OUTOF10: 8
PAINLEVEL_OUTOF10: 0

## 2024-07-02 ASSESSMENT — PAIN DESCRIPTION - LOCATION
LOCATION: ABDOMEN

## 2024-07-02 ASSESSMENT — PAIN - FUNCTIONAL ASSESSMENT
PAIN_FUNCTIONAL_ASSESSMENT: ACTIVITIES ARE NOT PREVENTED
PAIN_FUNCTIONAL_ASSESSMENT: ACTIVITIES ARE NOT PREVENTED

## 2024-07-02 ASSESSMENT — PAIN DESCRIPTION - ORIENTATION
ORIENTATION: LOWER

## 2024-07-02 ASSESSMENT — PAIN DESCRIPTION - FREQUENCY: FREQUENCY: INTERMITTENT

## 2024-07-02 ASSESSMENT — PAIN DESCRIPTION - DESCRIPTORS
DESCRIPTORS: CRAMPING
DESCRIPTORS: SHARP
DESCRIPTORS: CRAMPING

## 2024-07-02 ASSESSMENT — PAIN DESCRIPTION - PAIN TYPE: TYPE: SURGICAL PAIN

## 2024-07-02 NOTE — PROGRESS NOTES
Department of Obstetrics and Gynecology  Labor and Delivery   Post Partum Progress Note      SUBJECTIVE:  Doing well with no complaints. Ambulating throughout room without dizziness or other s/s of anemia. Reports bleeding is decreasing and pain is well controlled with medication. Denies pain or discharge at incisional site. Has voided without difficulty. Has not had BM, + flatus. Eating and drinking well. Denies HA/visual changes/epigastric pain. Bottlefeeding is going well. Reports good social support. Denies emotional concerns at this time.     OBJECTIVE:      Vitals:  /86   Pulse 71   Temp 98.2 °F (36.8 °C) (Oral)   Resp 18   LMP 11/01/2023   SpO2 98%   Breastfeeding Unknown   Lab Results   Component Value Date    WBC 11.3 (H) 07/02/2024    HGB 7.7 (L) 07/02/2024    HCT 23.7 (L) 07/02/2024    MCV 84.3 07/02/2024     07/02/2024       CONSTITUTIONAL: generally well-appearing.   ABDOMEN:  Soft, well contracted uterus. Fundus firm at u-1. C/s incision c/d/i. No erythema or discharge noted. BS present x 4 quadrants.   LOCHIA: Normal per pt  LUNGS: CTAB  HEART: RRR,   EXTREMITIES: No calf tenderness, erythema or swelling bilaterally       ASSESSMENT/PLAN: :      PPD # 1  S/p C/s- Twins  Bottlefeeding well  RH A+  Pre-eclampsia- On magnesium.   Anemia- will start ferrous sulfate    Was called to pts room for assessment. Pt states she is feeling \"Heavy\" states she is not short of breath. Denies any pain or n/v. Pt does have facial swelling. Will discontinue Magnesium and also given Iv benadryl.   Will continue to monitor pt  Plan to transfer to postpartum pending pt Bps.       Time Spent 15      Marychuy Winters, SHAILESH - BETHM

## 2024-07-02 NOTE — PROGRESS NOTES
Patient c/o of change in vision and body feeling \"heavy\". Patient denies epigastric pain, nausea or vomiting. Patient went from pitting +1 edema an hour ago to pitting +3 edema, face and eyes appear swollen. Patient states that before her breakfast she felt fine. After breakfast she had a stuffy nose and the vision changes. Patient states she ate nothing out of the ordinary for breakfast. Marychuy DUNNE notified and at bedside. No new orders at this time.

## 2024-07-02 NOTE — PROGRESS NOTES
Patients vitals are stable. Patient c/o of extreme dizziness when standing. Patient assisted to bedside commode due to pain and fudus being displaced to the right. Patient voided and assisted back into bed. Fundus is firm midline U-1 post void. Marychuy DUNNE at bedside. Ordered stat CMP and CBC.

## 2024-07-02 NOTE — FLOWSHEET NOTE
States she is \"feeling funny\".  Denies nausea, BP WNL.  Repositioned to left lateral side and immediately fell asleep.  Will continue to monitor.

## 2024-07-02 NOTE — PLAN OF CARE
Independent Practitioner   Instruct patient/family to notify RN of any seizure activity   Instruct patient/family to call for assistance with activity based on assessment  Taken 7/1/2024 1725 by Kristine Dickens RN  Remains free from seizures and injury related to seizure activity:   Monitor patient for seizure activity, document and report duration and description of seizure to Licensed Independent Practitioner   Instruct patient/family to call for assistance with activity based on assessment  Taken 7/1/2024 1625 by Kristine Dickens RN  Remains free from seizures and injury related to seizure activity:   Monitor patient for seizure activity, document and report duration and description of seizure to Licensed Independent Practitioner   Instruct patient/family to call for assistance with activity based on assessment  Taken 7/1/2024 1613 by Kristine Dickens RN  Remains free from seizures and injury related to seizure activity:   Monitor patient for seizure activity, document and report duration and description of seizure to Licensed Independent Practitioner   Instruct patient/family to call for assistance with activity based on assessment     Problem: Risk for Decreased Cardiac Output  Goal: Maintains optimal cardiac output and hemodynamic stability  Description: INTERVENTIONS:.  -Monitor blood pressure and heart rate  -Monitor urine output and notify licensed practitioner for values outside of   -Assess for signes of decreased cardiac output  -Administer fluid and /or volume expanders as ordered    7/2/2024 0742 by Miles Marino, RN  Outcome: Progressing  7/1/2024 2343 by Carmen Ny, RN  Outcome: Progressing  Flowsheets (Taken 7/1/2024 1927)  Maintains optimal cardiac output and hemodynamic stability: Monitor blood pressure and heart rate  Goal: Achieves optimal ventilation and oxygenation  Description: INTERVENTIONS:.  -Assess for changes in respiratory status   -Assess for changes in mentation and  free from complications associated with compression therapy  7/2/2024 0742 by Miles Marino RN  Outcome: Progressing  7/1/2024 2343 by Carmen Ny RN  Outcome: Progressing     Problem: Weight control:  Goal: Ability to maintain an optimal weight for height and age will be supported  Description: Ability to maintain an optimal weight for height and age will be supported  7/2/2024 0742 by Miles Marino RN  Outcome: Progressing  7/1/2024 2343 by Carmen Ny RN  Outcome: Progressing     Problem: Falls - Risk of:  Goal: Will remain free from falls  Description: Will remain free from falls  7/2/2024 0742 by Miles Marino RN  Outcome: Progressing  7/1/2024 2343 by Carmen Ny RN  Outcome: Progressing     Problem: Blood Glucose:  Goal: Ability to maintain appropriate glucose levels will improve  Description: Ability to maintain appropriate glucose levels will improve  7/2/2024 0742 by Miles Marino RN  Outcome: Progressing  7/1/2024 2343 by Carmen Ny RN  Outcome: Progressing

## 2024-07-02 NOTE — PROGRESS NOTES
Patient to room MB10 from L&D. Bilateral breath sounds clear on auscultation. Abdomen soft. Incision covered with 4x8 tefa covered by tegaderm in place clean and dry. IV infusing well with no signs of infiltration. Jaz-pad clean and dry with small amount of rubra seen on large white diaper pad. Patient oriented to room, tv, phone, call light, visitation, smoking, all instructed and patient voiced understanding. Family here at bedside. Patient denies any pain. SCD's in place. Patient instructed to cough and deep breath every 2 hours. K-Pad in place. Call light with in reach. Patient instructed not to get out of bed without assistance from the nurse. Fresh water pitcher, juice and crackers requested and given. Call light in reach.

## 2024-07-02 NOTE — PLAN OF CARE
Problem: Safety - Adult  Goal: Free from fall injury  7/1/2024 2343 by Carmen Ny RN  Outcome: Progressing  7/1/2024 1610 by Marcela Alaniz RN  Outcome: Progressing     Problem: Skin/Tissue Integrity  Goal: Absence of new skin breakdown  Description: 1.  Monitor for areas of redness and/or skin breakdown  2.  Assess vascular access sites hourly  3.  Every 4-6 hours minimum:  Change oxygen saturation probe site  4.  Every 4-6 hours:  If on nasal continuous positive airway pressure, respiratory therapy assess nares and determine need for appliance change or resting period.  7/1/2024 2343 by Carmen Ny RN  Outcome: Progressing  7/1/2024 1610 by Marcela Alaniz RN  Outcome: Progressing     Problem: Risk for Maternal Injury  Goal: Remains free from seizures and injury related to seizure activity  Description: INTERVENTIONS:.  -Maintain airway, patient safety and administer oxygen as ordered  -Monitor patient for seizure activity, document and report duration and descrition  -If Seizure occurs, turn patient to dide and suction secretions as needed  -Reorient patient post seizure  -Seizure Pads  -Instruct patient/family to notify RN of any seizure activity  -Instruct patient/family to call for assistance with activity based on assessment  7/1/2024 2343 by Carmen Ny RN  Outcome: Progressing  Flowsheets  Taken 7/1/2024 1825 by Kristine Dickens RN  Remains free from seizures and injury related to seizure activity:   Monitor patient for seizure activity, document and report duration and description of seizure to Licensed Independent Practitioner   Instruct patient/family to notify RN of any seizure activity   Instruct patient/family to call for assistance with activity based on assessment  Taken 7/1/2024 1725 by Kristine Dickens RN  Remains free from seizures and injury related to seizure activity:   Monitor patient for seizure activity, document and report duration and description of seizure to  Progressing  7/1/2024 1610 by Marcela Alaniz RN  Outcome: Progressing     Problem: Falls - Risk of:  Goal: Will remain free from falls  Description: Will remain free from falls  7/1/2024 2343 by Carmen Ny RN  Outcome: Progressing  7/1/2024 1610 by Marcela Alaniz RN  Outcome: Progressing     Problem: Blood Glucose:  Goal: Ability to maintain appropriate glucose levels will improve  Description: Ability to maintain appropriate glucose levels will improve  7/1/2024 2343 by Carmen Ny RN  Outcome: Progressing  7/1/2024 1610 by Marcela Alaniz RN  Outcome: Progressing     Problem: Skin/Tissue Integrity  Goal: Absence of new skin breakdown  Description: 1.  Monitor for areas of redness and/or skin breakdown  2.  Assess vascular access sites hourly  3.  Every 4-6 hours minimum:  Change oxygen saturation probe site  4.  Every 4-6 hours:  If on nasal continuous positive airway pressure, respiratory therapy assess nares and determine need for appliance change or resting period.  7/1/2024 2343 by Carmen Ny RN  Outcome: Progressing  7/1/2024 1610 by Marcela Alaniz RN  Outcome: Progressing     Problem: Risk for Maternal Injury  Goal: Remains free from seizures and injury related to seizure activity  Description: INTERVENTIONS:.  -Maintain airway, patient safety and administer oxygen as ordered  -Monitor patient for seizure activity, document and report duration and descrition  -If Seizure occurs, turn patient to dide and suction secretions as needed  -Reorient patient post seizure  -Seizure Pads  -Instruct patient/family to notify RN of any seizure activity  -Instruct patient/family to call for assistance with activity based on assessment  7/1/2024 2343 by Carmen Ny RN  Outcome: Progressing  Flowsheets  Taken 7/1/2024 1825 by Kristine Dickens RN  Remains free from seizures and injury related to seizure activity:   Monitor patient for seizure activity, document and report duration and

## 2024-07-02 NOTE — PROGRESS NOTES
Informed Consent for Blood Component Transfusion Note    I have discussed with the patient the rationale for blood component transfusion; its benefits in treating or preventing fatigue, organ damage, or death; and its risk which includes mild transfusion reactions, rare risk of blood borne infection, or more serious but rare reactions. I have discussed the alternatives to transfusion, including the risk and consequences of not receiving transfusion. The patient had an opportunity to ask questions and had agreed to proceed with transfusion of blood components.    Electronically signed by SHAILESH Hauser CNM on 7/2/24 at 2:43 PM EDT

## 2024-07-03 LAB
ABO/RH: NORMAL
ANTIBODY SCREEN: NEGATIVE
COMPONENT: NORMAL
CROSSMATCH RESULT: NORMAL
CULTURE: NORMAL
Lab: NORMAL
SPECIMEN: NORMAL
STATUS: NORMAL
TRANSFUSION STATUS: NORMAL
UNIT DIVISION: 0
UNIT NUMBER: NORMAL

## 2024-07-03 PROCEDURE — 6370000000 HC RX 637 (ALT 250 FOR IP): Performed by: OBSTETRICS & GYNECOLOGY

## 2024-07-03 PROCEDURE — APPNB30 APP NON BILLABLE TIME 0-30 MINS

## 2024-07-03 PROCEDURE — 6360000002 HC RX W HCPCS: Performed by: STUDENT IN AN ORGANIZED HEALTH CARE EDUCATION/TRAINING PROGRAM

## 2024-07-03 PROCEDURE — 2580000003 HC RX 258: Performed by: STUDENT IN AN ORGANIZED HEALTH CARE EDUCATION/TRAINING PROGRAM

## 2024-07-03 PROCEDURE — 6370000000 HC RX 637 (ALT 250 FOR IP): Performed by: ADVANCED PRACTICE MIDWIFE

## 2024-07-03 PROCEDURE — 6370000000 HC RX 637 (ALT 250 FOR IP)

## 2024-07-03 PROCEDURE — 1220000000 HC SEMI PRIVATE OB R&B

## 2024-07-03 PROCEDURE — 2580000003 HC RX 258: Performed by: ADVANCED PRACTICE MIDWIFE

## 2024-07-03 RX ADMIN — SODIUM CHLORIDE, PRESERVATIVE FREE 10 ML: 5 INJECTION INTRAVENOUS at 21:48

## 2024-07-03 RX ADMIN — SODIUM CHLORIDE, PRESERVATIVE FREE 10 ML: 5 INJECTION INTRAVENOUS at 05:27

## 2024-07-03 RX ADMIN — ACETAMINOPHEN 1000 MG: 500 TABLET ORAL at 08:11

## 2024-07-03 RX ADMIN — WATER 2000 MG: 1 INJECTION INTRAMUSCULAR; INTRAVENOUS; SUBCUTANEOUS at 05:27

## 2024-07-03 RX ADMIN — IBUPROFEN 800 MG: 800 TABLET, FILM COATED ORAL at 15:35

## 2024-07-03 RX ADMIN — FERROUS SULFATE TAB 325 MG (65 MG ELEMENTAL FE) 325 MG: 325 (65 FE) TAB at 08:12

## 2024-07-03 RX ADMIN — OXYCODONE HYDROCHLORIDE 5 MG: 5 TABLET ORAL at 05:28

## 2024-07-03 RX ADMIN — DOCUSATE SODIUM 100 MG: 100 CAPSULE, LIQUID FILLED ORAL at 08:12

## 2024-07-03 RX ADMIN — IBUPROFEN 800 MG: 800 TABLET, FILM COATED ORAL at 05:28

## 2024-07-03 RX ADMIN — ACETAMINOPHEN 1000 MG: 500 TABLET ORAL at 21:47

## 2024-07-03 RX ADMIN — WATER 2000 MG: 1 INJECTION INTRAMUSCULAR; INTRAVENOUS; SUBCUTANEOUS at 21:48

## 2024-07-03 RX ADMIN — DOCUSATE SODIUM 100 MG: 100 CAPSULE, LIQUID FILLED ORAL at 21:47

## 2024-07-03 ASSESSMENT — PAIN DESCRIPTION - LOCATION
LOCATION: ABDOMEN
LOCATION: ABDOMEN;INCISION
LOCATION: ABDOMEN;INCISION
LOCATION: ABDOMEN

## 2024-07-03 ASSESSMENT — PAIN DESCRIPTION - DESCRIPTORS
DESCRIPTORS: CRAMPING;ACHING
DESCRIPTORS: CRAMPING
DESCRIPTORS: SHARP

## 2024-07-03 ASSESSMENT — PAIN DESCRIPTION - ORIENTATION
ORIENTATION: LOWER
ORIENTATION: LOWER
ORIENTATION: LOWER;RIGHT

## 2024-07-03 ASSESSMENT — PAIN SCALES - GENERAL
PAINLEVEL_OUTOF10: 0
PAINLEVEL_OUTOF10: 1
PAINLEVEL_OUTOF10: 2
PAINLEVEL_OUTOF10: 2
PAINLEVEL_OUTOF10: 4

## 2024-07-03 NOTE — LACTATION NOTE
Infants in SCN. Mother states she would like to use electric breast pump. Hospital electric breast pump to mother and set up and showed her how to use pump. Encouraged mother to pump every 3 hours, even during the night, and to pump for 10-15 minutes. Mother verbalizes understanding.     Lanolin ointment given to mother. Instructed mother that only small amount is needed if she uses. Informed mother to apply small amount to nipple. Informed mother that she does not need to wipe off lanolin because it is safe for the infant. Informed mother that if nipples get sore she can use lanolin ointment, use her own breast milk and to let nipples air dry. Informed mother that these will help decrease soreness. Mother verbalizes understanding.     Electric breast pump prescription given.

## 2024-07-03 NOTE — PLAN OF CARE
Problem: Safety - Adult  Goal: Free from fall injury  7/3/2024 0857 by Tammi Alexandre RN  Outcome: Progressing  7/2/2024 2038 by Wilma Lord RN  Outcome: Progressing     Problem: Skin/Tissue Integrity  Goal: Absence of new skin breakdown  Description: 1.  Monitor for areas of redness and/or skin breakdown  2.  Assess vascular access sites hourly  3.  Every 4-6 hours minimum:  Change oxygen saturation probe site  4.  Every 4-6 hours:  If on nasal continuous positive airway pressure, respiratory therapy assess nares and determine need for appliance change or resting period.  7/3/2024 0857 by Tammi Alexandre RN  Outcome: Progressing  7/2/2024 2038 by Wilma Lord RN  Outcome: Progressing     Problem: Risk for Maternal Injury  Goal: Remains free from seizures and injury related to seizure activity  Description: INTERVENTIONS:.  -Maintain airway, patient safety and administer oxygen as ordered  -Monitor patient for seizure activity, document and report duration and descrition  -If Seizure occurs, turn patient to dide and suction secretions as needed  -Reorient patient post seizure  -Seizure Pads  -Instruct patient/family to notify RN of any seizure activity  -Instruct patient/family to call for assistance with activity based on assessment  7/3/2024 0857 by Tammi Alexandre RN  Outcome: Progressing  Flowsheets (Taken 7/3/2024 0813)  Remains free from seizures and injury related to seizure activity:   Monitor patient for seizure activity, document and report duration and description of seizure to Licensed Independent Practitioner   If seizure occurs, turn patient to side and suction secretions as needed  7/2/2024 2038 by Wilma Lord, RN  Outcome: Progressing     Problem: Risk for Decreased Cardiac Output  Goal: Maintains optimal cardiac output and hemodynamic stability  Description: INTERVENTIONS:.  -Monitor blood pressure and heart rate  -Monitor urine output and notify licensed

## 2024-07-03 NOTE — PROGRESS NOTES
Department of Obstetrics and Gynecology  Labor and Delivery   Post Partum Progress Note      SUBJECTIVE:  Doing well with no complaints. Ambulating throughout room without dizziness or other s/s of anemia. Reports bleeding is decreasing and pain is well controlled with medication. Denies pain or discharge at incisional site. Has voided without difficulty. Has not had BM, + flatus. Eating and drinking well. Denies HA/visual changes/epigastric pain. Bottlefeeding is going well. Reports good social support. Denies emotional concerns at this time.     OBJECTIVE:      Vitals:  /84   Pulse 68   Temp 98.4 °F (36.9 °C) (Oral)   Resp 16   LMP 11/01/2023   SpO2 98%   Breastfeeding Unknown   Lab Results   Component Value Date    WBC 11.3 (H) 07/02/2024    HGB 8.5 (L) 07/02/2024    HCT 26.4 (L) 07/02/2024    MCV 84.3 07/02/2024     07/02/2024       CONSTITUTIONAL: generally well-appearing.   ABDOMEN:  Soft, well contracted uterus. Fundus firm at u-1. C/s incision c/d/i. No erythema or discharge noted. BS present x 4 quadrants.   LOCHIA: Normal per pt  LUNGS: CTAB  HEART: RRR,   EXTREMITIES: No calf tenderness, erythema or swelling bilaterally       ASSESSMENT:      PPD # 2  S/p C/s- Twins  Infant in SCN   Bottlefeeding  well  RH A+  Pre-eclampsia- S/P magnesium   Anemia- on ferrous sulfate and s/p 1unit RBC     PLAN:     Will continue with PP POC   Will plan for discharge on PPD3.      Time Spent 15      SHAILESH Hauser - BETHM

## 2024-07-04 VITALS
RESPIRATION RATE: 18 BRPM | DIASTOLIC BLOOD PRESSURE: 84 MMHG | SYSTOLIC BLOOD PRESSURE: 126 MMHG | TEMPERATURE: 98.7 F | HEART RATE: 82 BPM | OXYGEN SATURATION: 100 %

## 2024-07-04 PROCEDURE — APPNB30 APP NON BILLABLE TIME 0-30 MINS

## 2024-07-04 PROCEDURE — 6370000000 HC RX 637 (ALT 250 FOR IP): Performed by: OBSTETRICS & GYNECOLOGY

## 2024-07-04 PROCEDURE — 2580000003 HC RX 258: Performed by: STUDENT IN AN ORGANIZED HEALTH CARE EDUCATION/TRAINING PROGRAM

## 2024-07-04 PROCEDURE — 6370000000 HC RX 637 (ALT 250 FOR IP): Performed by: ADVANCED PRACTICE MIDWIFE

## 2024-07-04 PROCEDURE — 94761 N-INVAS EAR/PLS OXIMETRY MLT: CPT

## 2024-07-04 PROCEDURE — 6360000002 HC RX W HCPCS: Performed by: STUDENT IN AN ORGANIZED HEALTH CARE EDUCATION/TRAINING PROGRAM

## 2024-07-04 PROCEDURE — 6360000002 HC RX W HCPCS: Performed by: ADVANCED PRACTICE MIDWIFE

## 2024-07-04 PROCEDURE — 6370000000 HC RX 637 (ALT 250 FOR IP)

## 2024-07-04 RX ORDER — IBUPROFEN 800 MG/1
800 TABLET ORAL EVERY 8 HOURS
Qty: 120 TABLET | Refills: 3 | Status: SHIPPED | OUTPATIENT
Start: 2024-07-04

## 2024-07-04 RX ORDER — OXYCODONE HYDROCHLORIDE 5 MG/1
5 TABLET ORAL EVERY 6 HOURS PRN
Qty: 20 TABLET | Refills: 0 | Status: SHIPPED | OUTPATIENT
Start: 2024-07-04 | End: 2024-07-11

## 2024-07-04 RX ORDER — FERROUS SULFATE 325(65) MG
325 TABLET ORAL 2 TIMES DAILY WITH MEALS
Qty: 30 TABLET | Refills: 3 | Status: SHIPPED | OUTPATIENT
Start: 2024-07-04

## 2024-07-04 RX ORDER — PSEUDOEPHEDRINE HCL 30 MG
100 TABLET ORAL 2 TIMES DAILY
Qty: 60 CAPSULE | Refills: 0 | Status: SHIPPED | OUTPATIENT
Start: 2024-07-04

## 2024-07-04 RX ADMIN — ENOXAPARIN SODIUM 40 MG: 100 INJECTION SUBCUTANEOUS at 00:15

## 2024-07-04 RX ADMIN — FERROUS SULFATE TAB 325 MG (65 MG ELEMENTAL FE) 325 MG: 325 (65 FE) TAB at 08:31

## 2024-07-04 RX ADMIN — OXYCODONE HYDROCHLORIDE 5 MG: 5 TABLET ORAL at 00:14

## 2024-07-04 RX ADMIN — IBUPROFEN 800 MG: 800 TABLET, FILM COATED ORAL at 00:15

## 2024-07-04 RX ADMIN — IBUPROFEN 800 MG: 800 TABLET, FILM COATED ORAL at 08:31

## 2024-07-04 RX ADMIN — WATER 2000 MG: 1 INJECTION INTRAMUSCULAR; INTRAVENOUS; SUBCUTANEOUS at 04:46

## 2024-07-04 RX ADMIN — DOCUSATE SODIUM 100 MG: 100 CAPSULE, LIQUID FILLED ORAL at 08:31

## 2024-07-04 RX ADMIN — ACETAMINOPHEN 1000 MG: 500 TABLET ORAL at 04:46

## 2024-07-04 ASSESSMENT — PAIN DESCRIPTION - ONSET: ONSET: GRADUAL

## 2024-07-04 ASSESSMENT — PAIN DESCRIPTION - DESCRIPTORS
DESCRIPTORS: SORE
DESCRIPTORS: CRAMPING;SORE

## 2024-07-04 ASSESSMENT — PAIN SCALES - GENERAL
PAINLEVEL_OUTOF10: 1
PAINLEVEL_OUTOF10: 4
PAINLEVEL_OUTOF10: 5

## 2024-07-04 ASSESSMENT — PAIN DESCRIPTION - LOCATION
LOCATION: ABDOMEN
LOCATION: ABDOMEN

## 2024-07-04 ASSESSMENT — PAIN DESCRIPTION - PAIN TYPE: TYPE: SURGICAL PAIN

## 2024-07-04 ASSESSMENT — PAIN DESCRIPTION - ORIENTATION
ORIENTATION: LOWER
ORIENTATION: LOWER

## 2024-07-04 ASSESSMENT — PAIN DESCRIPTION - FREQUENCY: FREQUENCY: INTERMITTENT

## 2024-07-04 NOTE — PROGRESS NOTES
Order for discharge received. Patient agreeable and ready for discharge. Due to hypertension and magnesium administration during this admission, patient provided with blood pressure cuff to take home. Educated on use of blood pressure cuff, advised to take and log BP twice a day, morning and night. Blood pressure check scheduled with birthing center Sunday 7/7 at 0800. Patient also scheduled for follow up appointment in office 7/9. Patient states understanding.     Reviewed with patient postpartum c/s education including incision care, activity, and medications. Medications available for  at [facility] pharmacy, patient advised. Educated on signs of postpartum hemorrhage and pre-eclampsia. Patient states understanding.    Education provided with teachback. Pain assessed, states pain is 0.     Infants are remaining admitted to special care nursery. Parents advised as to visiting hours, and escorted to special care nursery to visit.     Patient discharged ambulatory with all belongings.

## 2024-07-04 NOTE — PROGRESS NOTES
Department of Obstetrics and Gynecology  Labor and Delivery   Post Partum Progress Note      SUBJECTIVE:  Doing well with no complaints. Ambulating throughout room without dizziness or other s/s of anemia. Reports bleeding is decreasing and pain is well controlled with medication. Denies pain or discharge at incisional site. Has voided without difficulty. Has not had BM, + flatus. Eating and drinking well. Denies HA/visual changes/epigastric pain. Breastfeeding is going well. Reports good social support. Denies emotional concerns at this time.     OBJECTIVE:      Vitals:  /86   Pulse 73   Temp 99.4 °F (37.4 °C) (Oral)   Resp 18   LMP 11/01/2023   SpO2 97%   Breastfeeding Unknown   Lab Results   Component Value Date    WBC 11.3 (H) 07/02/2024    HGB 8.5 (L) 07/02/2024    HCT 26.4 (L) 07/02/2024    MCV 84.3 07/02/2024     07/02/2024       CONSTITUTIONAL: generally well-appearing.   ABDOMEN:  Soft, well contracted uterus. Fundus firm at u-1. C/s incision c/d/i. No erythema or discharge noted. BS present x 4 quadrants.   LOCHIA: Normal per pt  LUNGS: CTAB  HEART: RRR,   EXTREMITIES: No calf tenderness, erythema or swelling bilaterally       ASSESSMENT:      PPD # 3  S/p C/s- Twins  Infant in SCN   Bottlefeeding  well  RH A+  Pre-eclampsia- S/P magnesium   Anemia- on ferrous sulfate and s/p 1unit RBC     PLAN:     Will plan for discharge today   Discharge teaching completed including counseling on warning signs (heavy vaginal bleeding, s/s of preeclampsia, fever >100.4, ACHES, s/s of PPD, s/s of infection at incision site).  Rx for colace, ibuprofen and oxycodone.   Pt to schedule f/u BP check 3-5 days incision check at 1 week and pp visit at 6-8 weeks in the office.     Time Spent 15      SHAILESH Hauser CNM

## 2024-07-04 NOTE — DISCHARGE INSTRUCTIONS
need to take at least 6 weeks off work. It depends on the type of work you do and how you feel.     Ask your doctor when it is okay for you to have sex.   Diet    You can eat your normal diet. If your stomach is upset, try bland, low-fat foods like plain rice, broiled chicken, toast, and yogurt.     Drink plenty of fluids (unless your doctor tells you not to).     You may notice that your bowel movements are not regular right after your surgery. This is common. Try to avoid constipation and straining with bowel movements. You may want to take a fiber supplement every day. If you have not had a bowel movement after a couple of days, ask your doctor about taking a mild laxative.     If you are breastfeeding, limit alcohol. Alcohol can cause a lack of energy and other health problems for the baby when a breastfeeding woman drinks heavily. It can also get in the way of a mom's ability to feed her baby or to care for the child in other ways. There isn't a lot of research about exactly how much alcohol can harm a baby. Having no alcohol is the safest choice for your baby. If you choose to have a drink now and then, have only one drink, and limit the number of occasions that you have a drink. Wait to breastfeed at least 2 hours after you have a drink to reduce the amount of alcohol the baby may get in the milk.   Medicines    Your doctor will tell you if and when you can restart your medicines. You will also get instructions about taking any new medicines.     If you stopped taking aspirin or some other blood thinner, your doctor will tell you when to start taking it again.     Take pain medicines exactly as directed.  If the doctor gave you a prescription medicine for pain, take it as prescribed.  If you are not taking a prescription pain medicine, ask your doctor if you can take an over-the-counter medicine.     If you think your pain medicine is making you sick to your stomach:  Take your medicine after meals (unless  warmth, or redness at the injection site.  Tingling, weakness, or numbness in your legs or groin.   Watch closely for changes in your health, and be sure to contact your doctor or midwife if:    Your vaginal bleeding isn't decreasing.     You feel sad, anxious, or hopeless for more than a few days.     You are having problems with your breasts or breastfeeding.   Where can you learn more?  Go to https://www.[employer]/patientEd and enter M806 to learn more about \" Section: What to Expect at Home.\"  Current as of: July 10, 2023  Content Version: 14.1  © 0434-1075 [employer].   Care instructions adapted under license by [employer]. If you have questions about a medical condition or this instruction, always ask your healthcare professional. [employer] disclaims any warranty or liability for your use of this information.         Depression After Childbirth: Care Instructions  It's common to lose sleep, feel irritable, and cry easily during the first few days after childbirth. Hormone changes and the demands of a new baby can cause these \"baby blues.\" If these mood changes last more than 2 weeks, you may have postpartum depression. This is a medical condition that requires treatment.  If you have any of these signs, you may be depressed. See your doctor right away.    You feel very sad or hopeless and lose interest in daily activities.       You sleep too much or not enough.       You feel tired or as if you have no energy.       You eat too much or too little.       You write or talk about death.     Tips to help with postpartum depression        What you can do   Try to go to all of your counseling sessions.  Take medicines as directed.  Eat healthy foods.  Get daily exercise, such as walks.  Try to get some sunlight every day.  Avoid using alcohol or other substances.  Get as much rest as possible.  Connect with friends, and join a support group for new parents.  When should

## 2024-07-04 NOTE — PLAN OF CARE
cough, deep breathe, incentive spirometry  -Assess the need for suctioning and aspirate as needed  -Assess and instruct to report shortness of breath or any respiratory difficulty  -Respiratory therapy support as indicated      Outcome: Adequate for Discharge     Problem: Risk for Deficient Fluid Volume  Goal: Hemodynamic stability and optimal renal function maintained  Description: Interventions:.  -Monitor labs and assess for signs and symptoms of volume excess or deficit    -Monitor intake, output and patient weight  -Monitor response to interventions for patient's volume status, including labs, urine output, blood pressure (other measures as available)  -Fluid restriction as ordered  -Instruct patient on fluid and nutrition restrictions as appropriate      Outcome: Adequate for Discharge     Problem: Pain  Goal: Verbalizes/displays adequate comfort level or baseline comfort level  Outcome: Adequate for Discharge     Problem: Cognitive:  Goal: Knowledge of wound care  Description: Knowledge of wound care  Outcome: Adequate for Discharge  Goal: Understands risk factors for wounds  Description: Understands risk factors for wounds  Outcome: Adequate for Discharge     Problem: Wound:  Goal: Will show signs of wound healing; wound closure and no evidence of infection  Description: Will show signs of wound healing; wound closure and no evidence of infection  Outcome: Adequate for Discharge     Problem: Pressure Ulcer:  Goal: Signs of wound healing will improve  Description: Signs of wound healing will improve  Outcome: Adequate for Discharge  Goal: Absence of new pressure ulcer  Description: Absence of new pressure ulcer  Outcome: Adequate for Discharge  Goal: Will show no infection signs and symptoms  Description: Will show no infection signs and symptoms  Outcome: Adequate for Discharge     Problem: Arterial:  Goal: Optimize blood flow for wound healing  Description: Optimize blood flow for wound healing  Outcome:  Adequate for Discharge     Problem: Venous:  Goal: Signs of wound healing will improve  Description: Signs of wound healing will improve  Outcome: Adequate for Discharge     Problem: Smoking cessation:  Goal: Ability to formulate a plan to maintain a tobacco-free life will be supported  Description: Ability to formulate a plan to maintain a tobacco-free life will be supported  Outcome: Adequate for Discharge     Problem: Compression therapy:  Goal: Will be free from complications associated with compression therapy  Description: Will be free from complications associated with compression therapy  Outcome: Adequate for Discharge     Problem: Weight control:  Goal: Ability to maintain an optimal weight for height and age will be supported  Description: Ability to maintain an optimal weight for height and age will be supported  Outcome: Adequate for Discharge     Problem: Falls - Risk of:  Goal: Will remain free from falls  Description: Will remain free from falls  Outcome: Adequate for Discharge     Problem: Blood Glucose:  Goal: Ability to maintain appropriate glucose levels will improve  Description: Ability to maintain appropriate glucose levels will improve  Outcome: Adequate for Discharge     Problem: Postpartum  Goal: Experiences normal postpartum course  Description:  Postpartum OB-Pregnancy care plan goal which identifies if the mother is experiencing a normal postpartum course  Outcome: Adequate for Discharge  Goal: Appropriate maternal -  bonding  Description:  Postpartum OB-Pregnancy care plan goal which identifies if the mother and  are bonding appropriately  Outcome: Adequate for Discharge  Goal: Establishment of infant feeding pattern  Description:  Postpartum OB-Pregnancy care plan goal which identifies if the mother is establishing a feeding pattern with their   Outcome: Adequate for Discharge  Goal: Incisions, wounds, or drain sites healing without S/S of infection  Outcome:

## 2024-07-04 NOTE — DISCHARGE SUMMARY
Obstetrical Discharge Form    Gestational Age:  34w5d    Antepartum complications: non-reassuring fetal testing and  labor, mono/di twins     Date of Delivery:   2024      Type of Delivery:    for fetal distress    Delivered By:   Dr Mazariegos              Baby:       Information for the patient's :  NorajaquanonOrlando [[id number]]      Information for the patient's :  Orlando Hassan [[id number]]      Anesthesia:    Epidural    Intrapartum complications:  Labor    Feeding method:   bottle -     Postpartum complications: anemia and preeclampsia/eclampsia    Discharge Date:   2024    Condition of discharge:  good    Plan:   Follow up    in 3-5 day(s)

## 2024-07-07 ENCOUNTER — HOSPITAL ENCOUNTER (OUTPATIENT)
Age: 29
Discharge: HOME | End: 2024-07-07
Attending: OBSTETRICS & GYNECOLOGY | Admitting: OBSTETRICS & GYNECOLOGY
Payer: COMMERCIAL

## 2024-07-07 VITALS
OXYGEN SATURATION: 100 % | TEMPERATURE: 98.4 F | HEART RATE: 93 BPM | RESPIRATION RATE: 16 BRPM | DIASTOLIC BLOOD PRESSURE: 95 MMHG | SYSTOLIC BLOOD PRESSURE: 138 MMHG

## 2024-07-07 PROBLEM — O30.033 MONOCHORIONIC DIAMNIOTIC TWIN GESTATION IN THIRD TRIMESTER: Status: RESOLVED | Noted: 2024-07-01 | Resolved: 2024-07-07

## 2024-07-07 PROBLEM — O14.13 SEVERE PRE-ECLAMPSIA IN THIRD TRIMESTER: Status: RESOLVED | Noted: 2024-07-01 | Resolved: 2024-07-07

## 2024-07-07 PROBLEM — Z3A.34 34 WEEKS GESTATION OF PREGNANCY: Status: RESOLVED | Noted: 2024-07-01 | Resolved: 2024-07-07

## 2024-07-07 LAB
ALBUMIN SERPL-MCNC: 3 GM/DL (ref 3.4–5)
ALP BLD-CCNC: 129 IU/L (ref 40–129)
ALT SERPL-CCNC: 28 U/L (ref 10–40)
ANION GAP SERPL CALCULATED.3IONS-SCNC: 10 MMOL/L (ref 7–16)
AST SERPL-CCNC: 37 IU/L (ref 15–37)
BACTERIA: NEGATIVE /HPF
BASOPHILS ABSOLUTE: 0 K/CU MM
BASOPHILS RELATIVE PERCENT: 0.1 % (ref 0–1)
BILIRUB SERPL-MCNC: 1 MG/DL (ref 0–1)
BILIRUBIN, URINE: NEGATIVE MG/DL
BLOOD, URINE: ABNORMAL
BUN SERPL-MCNC: 8 MG/DL (ref 6–23)
CALCIUM SERPL-MCNC: 8.4 MG/DL (ref 8.3–10.6)
CHLORIDE BLD-SCNC: 104 MMOL/L (ref 99–110)
CLARITY, UA: CLEAR
CO2: 24 MMOL/L (ref 21–32)
COLOR, UA: YELLOW
CREAT SERPL-MCNC: 0.6 MG/DL (ref 0.6–1.1)
DIFFERENTIAL TYPE: ABNORMAL
EOSINOPHILS ABSOLUTE: 0.1 K/CU MM
EOSINOPHILS RELATIVE PERCENT: 0.8 % (ref 0–3)
GFR, ESTIMATED: >90 ML/MIN/1.73M2
GLUCOSE SERPL-MCNC: 90 MG/DL (ref 70–99)
GLUCOSE URINE: NEGATIVE MG/DL
HCT VFR BLD CALC: 21.5 % (ref 37–47)
HEMOGLOBIN: 7 GM/DL (ref 12.5–16)
IMMATURE NEUTROPHIL %: 0.6 % (ref 0–0.43)
KETONES, URINE: NEGATIVE MG/DL
LEUKOCYTE ESTERASE, URINE: NEGATIVE
LYMPHOCYTES ABSOLUTE: 1.2 K/CU MM
LYMPHOCYTES RELATIVE PERCENT: 16.7 % (ref 24–44)
MCH RBC QN AUTO: 27.2 PG (ref 27–31)
MCHC RBC AUTO-ENTMCNC: 32.6 % (ref 32–36)
MCV RBC AUTO: 83.7 FL (ref 78–100)
MONOCYTES ABSOLUTE: 0.8 K/CU MM
MONOCYTES RELATIVE PERCENT: 11.4 % (ref 0–4)
MUCUS: ABNORMAL HPF
NEUTROPHILS ABSOLUTE: 5 K/CU MM
NEUTROPHILS RELATIVE PERCENT: 70.4 % (ref 36–66)
NITRITE URINE, QUANTITATIVE: NEGATIVE
NUCLEATED RBC %: 1 %
PDW BLD-RTO: 16.4 % (ref 11.7–14.9)
PH, URINE: 6 (ref 5–8)
PLATELET # BLD: 389 K/CU MM (ref 140–440)
PMV BLD AUTO: 11.7 FL (ref 7.5–11.1)
POTASSIUM SERPL-SCNC: 5 MMOL/L (ref 3.5–5.1)
PROTEIN UA: ABNORMAL MG/DL
RBC # BLD: 2.57 M/CU MM (ref 4.2–5.4)
RBC URINE: 39 /HPF (ref 0–6)
SODIUM BLD-SCNC: 138 MMOL/L (ref 135–145)
SPECIFIC GRAVITY UA: 1.02 (ref 1–1.03)
SQUAMOUS EPITHELIAL: 1 /HPF
TOTAL IMMATURE NEUTOROPHIL: 0.04 K/CU MM
TOTAL NUCLEATED RBC: 0.1 K/CU MM
TOTAL PROTEIN: 5.9 GM/DL (ref 6.4–8.2)
TRICHOMONAS: ABNORMAL /HPF
UROBILINOGEN, URINE: 0.2 MG/DL (ref 0.2–1)
WBC # BLD: 7.1 K/CU MM (ref 4–10.5)
WBC UA: 2 /HPF (ref 0–5)

## 2024-07-07 PROCEDURE — 6370000000 HC RX 637 (ALT 250 FOR IP): Performed by: OBSTETRICS & GYNECOLOGY

## 2024-07-07 PROCEDURE — 85025 COMPLETE CBC W/AUTO DIFF WBC: CPT

## 2024-07-07 PROCEDURE — 99213 OFFICE O/P EST LOW 20 MIN: CPT | Performed by: ADVANCED PRACTICE MIDWIFE

## 2024-07-07 PROCEDURE — 81001 URINALYSIS AUTO W/SCOPE: CPT

## 2024-07-07 PROCEDURE — 6370000000 HC RX 637 (ALT 250 FOR IP): Performed by: ADVANCED PRACTICE MIDWIFE

## 2024-07-07 PROCEDURE — 99213 OFFICE O/P EST LOW 20 MIN: CPT

## 2024-07-07 PROCEDURE — 80053 COMPREHEN METABOLIC PANEL: CPT

## 2024-07-07 RX ORDER — NIFEDIPINE 10 MG/1
10 CAPSULE ORAL
Status: COMPLETED | OUTPATIENT
Start: 2024-07-07 | End: 2024-07-07

## 2024-07-07 RX ORDER — ACETAMINOPHEN 500 MG
1000 TABLET ORAL EVERY 8 HOURS PRN
Status: DISCONTINUED | OUTPATIENT
Start: 2024-07-07 | End: 2024-07-07 | Stop reason: HOSPADM

## 2024-07-07 RX ORDER — NIFEDIPINE 10 MG/1
20 CAPSULE ORAL
Status: DISCONTINUED | OUTPATIENT
Start: 2024-07-07 | End: 2024-07-07 | Stop reason: HOSPADM

## 2024-07-07 RX ORDER — NIFEDIPINE 30 MG/1
30 TABLET, EXTENDED RELEASE ORAL DAILY
Status: DISCONTINUED | OUTPATIENT
Start: 2024-07-07 | End: 2024-07-07 | Stop reason: HOSPADM

## 2024-07-07 RX ORDER — NIFEDIPINE 30 MG/1
30 TABLET, EXTENDED RELEASE ORAL 2 TIMES DAILY
Qty: 30 TABLET | Refills: 1 | Status: SHIPPED | OUTPATIENT
Start: 2024-07-07

## 2024-07-07 RX ORDER — LABETALOL HYDROCHLORIDE 5 MG/ML
20 INJECTION, SOLUTION INTRAVENOUS
Status: DISCONTINUED | OUTPATIENT
Start: 2024-07-07 | End: 2024-07-07 | Stop reason: HOSPADM

## 2024-07-07 RX ADMIN — NIFEDIPINE 30 MG: 30 TABLET, FILM COATED, EXTENDED RELEASE ORAL at 20:25

## 2024-07-07 RX ADMIN — NIFEDIPINE 10 MG: 10 CAPSULE ORAL at 17:41

## 2024-07-07 RX ADMIN — NIFEDIPINE 20 MG: 10 CAPSULE ORAL at 18:34

## 2024-07-07 NOTE — FLOWSHEET NOTE
RN to bedside to draw labs and place IV. Patient's BP result severe range. BP cuff noted to be in AC and patient has arm bent.  BP cuff adjusted and patient educated. Ankita DUNNE notified.    IV placed and BP repeated after 15 mins. Repeat BP is severe range.

## 2024-07-07 NOTE — PROGRESS NOTES
Department of Obstetrics and Gynecology  Labor and Delivery  TRIAGE NOTE      SUBJECTIVE:    Chief Complaint   Patient presents with    Hypertension     BP recheck     Andrew is a 29 yo  on POD6 that came to triage for a BP check after having preeclampsia in the immediate postpartum period. She did receive 24 hours of postpartum MgSO4 and was discharged home normotensive and not on medication. Upon arrival to the unit, she reports she has been logging her Bps at home and they have been consistently 160s/90s. She reports a HA this morning but it has since resolved without medication. She denies visual changes/epigastric pain.     OBJECTIVE    Vitals:  BP (!) 138/96   Pulse 74   Temp 98.3 °F (36.8 °C) (Oral)   Resp 20   LMP 2023   SpO2 100%       CONSTITUTIONAL:  negative  RESPIRATORY:  negative  CARDIOVASCULAR:  negative  GASTROINTESTINAL:  negative  ALLERGIC/IMMUNOLOGIC:  negative  NEUROLOGICAL:  negative  BEHAVIOR/PSYCH:  negative      DATA:  Lab Results   Component Value Date    WBC 7.1 2024    HGB 7.0 (L) 2024    HCT 21.5 (L) 2024    MCV 83.7 2024     2024      Latest Reference Range & Units 24 17:26   Sodium 135 - 145 MMOL/L 138   Potassium 3.5 - 5.1 MMOL/L 5.0   Chloride 99 - 110 mMol/L 104   CARBON DIOXIDE 21 - 32 MMOL/L 24   BUN,BUNPL 6 - 23 MG/DL 8   Creatinine 0.6 - 1.1 MG/DL 0.6   Anion Gap 7 - 16  10   Est, Glom Filt Rate >60 mL/min/1.73m2 >90   Glucose 70 - 99 MG/DL 90   Calcium 8.3 - 10.6 MG/DL 8.4   Total Protein 6.4 - 8.2 GM/DL 5.9 (L)   Albumin 3.4 - 5.0 GM/DL 3.0 (L)   Alkaline Phosphatase 40 - 129 IU/L 129   ALT 10 - 40 U/L 28   AST 15 - 37 IU/L 37   Total Bilirubin 0.0 - 1.0 MG/DL 1.0   (L): Data is abnormally low      ASSESSMENT:       29 yo  on POD6  Preeclampsia s/p pp MgSO4  Hypertension    PLAN:    Pt with severe range BP x 2. Pre-E labs drawn and WNL.     Treated with PO nifedipine x 1 per severe range protocol. Will

## 2024-07-08 NOTE — FLOWSHEET NOTE
Discharge instructions given, questions answered. Pt voiced understanding. Ambulatory off of unit with no s/s of distress noted.

## 2024-07-09 ENCOUNTER — OFFICE VISIT (OUTPATIENT)
Dept: OBGYN | Age: 29
End: 2024-07-09

## 2024-07-09 VITALS
WEIGHT: 128 LBS | DIASTOLIC BLOOD PRESSURE: 89 MMHG | SYSTOLIC BLOOD PRESSURE: 132 MMHG | BODY MASS INDEX: 22.68 KG/M2 | HEIGHT: 63 IN

## 2024-07-09 DIAGNOSIS — Z98.891 STATUS POST PRIMARY LOW TRANSVERSE CESAREAN SECTION: ICD-10-CM

## 2024-07-09 DIAGNOSIS — O14.13 SEVERE PRE-ECLAMPSIA IN THIRD TRIMESTER: ICD-10-CM

## 2024-07-09 DIAGNOSIS — O99.340 DEPRESSION AFFECTING PREGNANCY: ICD-10-CM

## 2024-07-09 DIAGNOSIS — F32.A DEPRESSION AFFECTING PREGNANCY: ICD-10-CM

## 2024-07-09 PROCEDURE — 0503F POSTPARTUM CARE VISIT: CPT | Performed by: STUDENT IN AN ORGANIZED HEALTH CARE EDUCATION/TRAINING PROGRAM

## 2024-07-09 NOTE — PROGRESS NOTES
Department of Obstetrics and Gynecology  Postpartum Office Visit  Name:  Andrew Hassan   CSN: [id number]    : 1995   Age: 28 y.o.        EDC: Estimated Date of Delivery: 24     Andrew Hassan is a 28 y.o.  that presents at 2 weeks for routine postpartum exam following PLTCS secondary to NRFHTs with 34w [place] Di twins without complications.    SUBJECTIVE: Pt doing well today without complaints. Pt denies headache, vision changes, right upper quadrant pain, edema. Lochia mild. Breast feeding. Denies depression, anxiety. Sleeping appropriately; +support system at home. Pregnancy complications include [place] di twins, 34w  labor, preeclampsia with severe features, depression.     GYN HX:    Cervical cancer screening:  Last pap smear 24.  No history of abnormal pap smears.    Patient's medications, allergies, past medical, surgical, social and family histories were reviewed and updated as appropriate.    ROS:  Constitutional: negative for fever, or chills  Respiratory: negative for cough, wheezing, SOB  Cardiovascular: negative for chest pain, palpitations, chest pressure/discomfort  Gastrointestinal: negative for nausea, vomiting, constipation, diarrhea, abdominal pain, change in bowel habits  Genitourinary: negative for dysuria, hematuria, trouble voiding, or incontinence; negative for vaginal discharge, itching, odor, or lesions  Breast: negative for breast lump, breast tenderness, nipple discharge, rash and skin color change  Musculoskeletal: negative for joint swelling or pain  Neurological: negative for headaches or dizziness  Behavioral/Psych: negative for depression/anxiety    OBJECTIVE:   /89 (Site: Left Upper Arm, Position: Sitting, Cuff Size: Large Adult)   Ht 1.6 m (5' 3\")   Wt 58.1 kg (128 lb)   LMP 2023   Breastfeeding Yes   BMI 22.67 kg/m²     Constitutional: healthy appearing, well nourished, well developed.  Psychiatric: oriented to time/place/person,

## 2024-08-03 SDOH — ECONOMIC STABILITY: TRANSPORTATION INSECURITY
IN THE PAST 12 MONTHS, HAS LACK OF TRANSPORTATION KEPT YOU FROM MEETINGS, WORK, OR FROM GETTING THINGS NEEDED FOR DAILY LIVING?: YES

## 2024-08-03 SDOH — ECONOMIC STABILITY: FOOD INSECURITY: WITHIN THE PAST 12 MONTHS, THE FOOD YOU BOUGHT JUST DIDN'T LAST AND YOU DIDN'T HAVE MONEY TO GET MORE.: SOMETIMES TRUE

## 2024-08-03 SDOH — ECONOMIC STABILITY: INCOME INSECURITY: HOW HARD IS IT FOR YOU TO PAY FOR THE VERY BASICS LIKE FOOD, HOUSING, MEDICAL CARE, AND HEATING?: SOMEWHAT HARD

## 2024-08-03 SDOH — ECONOMIC STABILITY: FOOD INSECURITY: WITHIN THE PAST 12 MONTHS, YOU WORRIED THAT YOUR FOOD WOULD RUN OUT BEFORE YOU GOT MONEY TO BUY MORE.: OFTEN TRUE

## 2024-08-06 ENCOUNTER — POSTPARTUM VISIT (OUTPATIENT)
Dept: OBGYN | Age: 29
End: 2024-08-06

## 2024-08-06 ENCOUNTER — HOSPITAL ENCOUNTER (OUTPATIENT)
Age: 29
Setting detail: SPECIMEN
Discharge: HOME | End: 2024-08-06

## 2024-08-06 VITALS
WEIGHT: 124 LBS | HEIGHT: 63 IN | DIASTOLIC BLOOD PRESSURE: 75 MMHG | SYSTOLIC BLOOD PRESSURE: 126 MMHG | BODY MASS INDEX: 21.97 KG/M2

## 2024-08-06 DIAGNOSIS — O14.13 SEVERE PRE-ECLAMPSIA IN THIRD TRIMESTER: ICD-10-CM

## 2024-08-06 DIAGNOSIS — R87.810 CERVICAL HIGH RISK HPV (HUMAN PAPILLOMAVIRUS) TEST POSITIVE: ICD-10-CM

## 2024-08-06 DIAGNOSIS — F32.A DEPRESSION AFFECTING PREGNANCY: ICD-10-CM

## 2024-08-06 DIAGNOSIS — N94.6 DYSMENORRHEA: ICD-10-CM

## 2024-08-06 DIAGNOSIS — O99.340 DEPRESSION AFFECTING PREGNANCY: ICD-10-CM

## 2024-08-06 DIAGNOSIS — Z98.891 STATUS POST PRIMARY LOW TRANSVERSE CESAREAN SECTION: ICD-10-CM

## 2024-08-06 PROCEDURE — 0503F POSTPARTUM CARE VISIT: CPT | Performed by: STUDENT IN AN ORGANIZED HEALTH CARE EDUCATION/TRAINING PROGRAM

## 2024-08-06 RX ORDER — MEDROXYPROGESTERONE ACETATE 150 MG/ML
150 INJECTION, SUSPENSION INTRAMUSCULAR ONCE
Qty: 1 EACH | Refills: 4 | Status: SHIPPED | OUTPATIENT
Start: 2024-08-06 | End: 2024-08-06

## 2024-08-06 NOTE — PROGRESS NOTES
Department of Obstetrics and Gynecology  Postpartum Office Visit  Name:  Andrew Hassan   CSN: [id number]    : 1995   Age: 28 y.o.        EDC: Estimated Date of Delivery: 24     Andrew Hassan is a 28 y.o.  that presents at 6 weeks for routine postpartum exam following PLTCS secondary to NRFHTs with 34w [place] Di twins without complications.    SUBJECTIVE: Pt doing well today without complaints. Reports dysmenorrhea. Pt denies headache, vision changes, right upper quadrant pain, edema. Lochia none. Bottle feeding. Denies depression, anxiety. Sleeping appropriately; +support system at home. Pregnancy complications include [place] di twins, 34w  labor, preeclampsia with severe features, depression.     GYN HX:    Cervical cancer screening:  Last pap smear 24.  No history of abnormal pap smears.    Patient's medications, allergies, past medical, surgical, social and family histories were reviewed and updated as appropriate.    ROS:  Constitutional: negative for fever, or chills  Respiratory: negative for cough, wheezing, SOB  Cardiovascular: negative for chest pain, palpitations, chest pressure/discomfort  Gastrointestinal: negative for nausea, vomiting, constipation, diarrhea, abdominal pain, change in bowel habits  Genitourinary: negative for dysuria, hematuria, trouble voiding, or incontinence; negative for vaginal discharge, itching, odor, or lesions  Breast: negative for breast lump, breast tenderness, nipple discharge, rash and skin color change  Musculoskeletal: negative for joint swelling or pain  Neurological: negative for headaches or dizziness  Behavioral/Psych: negative for depression/anxiety    OBJECTIVE:   /75   Ht 1.6 m (5' 3\")   Wt 56.2 kg (124 lb)   LMP 2023   Breastfeeding No   BMI 21.97 kg/m²     Constitutional: healthy appearing, well nourished, well developed.  Psychiatric: oriented to time/place/person, active and alert, normal mood/affect  Skin: no

## 2024-08-14 ENCOUNTER — NURSE ONLY (OUTPATIENT)
Dept: OBGYN | Age: 29
End: 2024-08-14
Payer: COMMERCIAL

## 2024-08-14 ENCOUNTER — PATIENT MESSAGE (OUTPATIENT)
Dept: OBGYN | Age: 29
End: 2024-08-14

## 2024-08-14 VITALS
BODY MASS INDEX: 22.15 KG/M2 | DIASTOLIC BLOOD PRESSURE: 78 MMHG | HEIGHT: 63 IN | SYSTOLIC BLOOD PRESSURE: 112 MMHG | WEIGHT: 125 LBS

## 2024-08-14 DIAGNOSIS — N94.6 DYSMENORRHEA: Primary | ICD-10-CM

## 2024-08-14 LAB
CONTROL: NORMAL
PREGNANCY TEST URINE, POC: NEGATIVE

## 2024-08-14 PROCEDURE — 81025 URINE PREGNANCY TEST: CPT | Performed by: STUDENT IN AN ORGANIZED HEALTH CARE EDUCATION/TRAINING PROGRAM

## 2024-08-14 PROCEDURE — 96372 THER/PROPH/DIAG INJ SC/IM: CPT | Performed by: STUDENT IN AN ORGANIZED HEALTH CARE EDUCATION/TRAINING PROGRAM

## 2024-08-14 RX ORDER — MEDROXYPROGESTERONE ACETATE 150 MG/ML
150 INJECTION, SUSPENSION INTRAMUSCULAR ONCE
Status: COMPLETED | OUTPATIENT
Start: 2024-08-14 | End: 2024-08-14

## 2024-08-14 RX ADMIN — MEDROXYPROGESTERONE ACETATE 150 MG: 150 INJECTION, SUSPENSION INTRAMUSCULAR at 13:45

## 2024-08-14 NOTE — PROGRESS NOTES
Andrew Hassan  1995    Chief Complaint   Patient presents with    Injections     Depo provera given       Administrations This Visit       medroxyPROGESTERone (DEPO-PROVERA) injection 150 mg       Admin Date  08/14/2024  13:45 Action  Given Dose  150 mg Route  IntraMUSCular Site  Other Documented By  Jenny Wright LPN    NDC: [address]-8    Lot#: [id number]    : [employer]    Patient Supplied?: Yes    Comments: LUOQ glut                     ASSESSMENT AND PLAN   Diagnosis Orders   1. Dysmenorrhea  POCT urine pregnancy    medroxyPROGESTERone (DEPO-PROVERA) injection 150 mg            No follow-ups on file.

## 2024-11-06 ENCOUNTER — NURSE ONLY (OUTPATIENT)
Dept: OBGYN | Age: 29
End: 2024-11-06
Payer: COMMERCIAL

## 2024-11-06 VITALS
WEIGHT: 123 LBS | HEART RATE: 66 BPM | BODY MASS INDEX: 21.79 KG/M2 | DIASTOLIC BLOOD PRESSURE: 69 MMHG | SYSTOLIC BLOOD PRESSURE: 117 MMHG | HEIGHT: 63 IN

## 2024-11-06 DIAGNOSIS — N94.6 DYSMENORRHEA: Primary | ICD-10-CM

## 2024-11-06 PROCEDURE — 96372 THER/PROPH/DIAG INJ SC/IM: CPT | Performed by: STUDENT IN AN ORGANIZED HEALTH CARE EDUCATION/TRAINING PROGRAM

## 2024-11-06 RX ORDER — MEDROXYPROGESTERONE ACETATE 150 MG/ML
150 INJECTION, SUSPENSION INTRAMUSCULAR ONCE
Status: COMPLETED | OUTPATIENT
Start: 2024-11-06 | End: 2024-11-06

## 2024-11-06 RX ADMIN — MEDROXYPROGESTERONE ACETATE 150 MG: 150 INJECTION, SUSPENSION INTRAMUSCULAR at 11:28

## 2024-11-06 NOTE — PROGRESS NOTES
Administrations This Visit       medroxyPROGESTERone (DEPO-PROVERA) injection 150 mg       Admin Date  11/06/2024  11:28 Action  Given Dose  150 mg Route  IntraMUSCular Site  Other Documented By  Iris Soriano MA    NDC: [address]-7    Lot#: [id number]    : [employer]    Patient Supplied?: Yes    Comments: JAMI Morales [place]  1995    Chief Complaint   Patient presents with    Injections     Pt here for depo provera injection IM d/t dysmenorrhea.           ASSESSMENT AND PLAN   Diagnosis Orders   1. Dysmenorrhea  medroxyPROGESTERone (DEPO-PROVERA) injection 150 mg            Return in about 12 weeks (around 1/29/2025).

## (undated) DEVICE — ELECTRODE ES AD CRDLSS PT RET REM POLYHESIVE

## (undated) DEVICE — TOWEL,OR,DSP,ST,BLUE,STD,6/PK,12PK/CS: Brand: MEDLINE

## (undated) DEVICE — FETAL PILLOW IS A SINGLE-USE,STERILE FETAL HEAD ELEVATOR CONSISTING OF A MECHANISM THAT ELEVATES THE FETAL HEAD TO FACILITATE DELIVERY DURING A CESAREAN SECTION.FETAL PILLOW IS INTENDED TO ELEVATE THE FETAL HEAD AND FACILITATE DELIVERY OF THE FETUS IN WOMEN REQUIRING A CESAREAN SECTION AT FULL DILATION OR THOSE REQUIRING A CESAREAN SECTION AFTER A FAILED INSTRUMENTAL VAGINAL DELIVERY.: Brand: SOS

## (undated) DEVICE — SPONGE GZ W4XL8IN COT WVN 12 PLY

## (undated) DEVICE — CLEANER,CAUTERY TIP,2X2",STERILE: Brand: MEDLINE

## (undated) DEVICE — SUTURE VICRYL ABSRB BRAID COAT UD CTX 3-0 36IN  J980H

## (undated) DEVICE — STRIP SKIN CLSR W1XL5IN NYLON REINF CURAD STERIL

## (undated) DEVICE — GARMENT,MEDLINE,DVT,INT,CALF,MED, GEN2: Brand: MEDLINE

## (undated) DEVICE — APPLICATOR MEDICATED 26 CC SOLUTION HI LT ORNG CHLORAPREP

## (undated) DEVICE — TOTAL TRAY, DB, 100% SILI FOLEY, 16FR 10: Brand: MEDLINE

## (undated) DEVICE — DRESSING TRNSPAR W8XL12IN FLM SURESITE 123

## (undated) DEVICE — MATTRESS MAXI AIR TRNSF SGL PT USE DCS 37 45 48 52 75

## (undated) DEVICE — DRAPE SHEET ULTRAGARD: Brand: MEDLINE

## (undated) DEVICE — 4-PORT MANIFOLD: Brand: NEPTUNE 2

## (undated) DEVICE — LARGE, DISPOSABLE ALEXIS O C-SECTION PROTECTOR - RETRACTOR: Brand: ALEXIS ® O C-SECTION PROTECTOR - RETRACTOR

## (undated) DEVICE — GOWN,ECLIPSE,POLYRNF,BRTHSLV,L,30/CS: Brand: MEDLINE

## (undated) DEVICE — STRIP,CLOSURE,WOUND,MEDI-STRIP,1/2X4: Brand: MEDLINE

## (undated) DEVICE — SUTURE MONOCRYL SZ 3-0 L27IN ABSRB UD L60MM KS STR REV CUT Y523H

## (undated) DEVICE — 3M™ STERI-STRIP™ COMPOUND BENZOIN TINCTURE 40 BAGS/CARTON 4 CARTONS/CASE C1544: Brand: 3M™ STERI-STRIP™

## (undated) DEVICE — SUTURE VICRYL SZ 1 L36IN ABSRB VLT L40MM CT 1/2 CIR J359H

## (undated) DEVICE — PACK PROCEDURE SURG C SECT

## (undated) DEVICE — BLOOD TRANSFER DEVICE: Brand: MEDLINE

## (undated) DEVICE — BABY BLANKET KIT: Brand: MEDLINE INDUSTRIES, INC.